# Patient Record
Sex: FEMALE | Race: WHITE | NOT HISPANIC OR LATINO | Employment: OTHER | URBAN - METROPOLITAN AREA
[De-identification: names, ages, dates, MRNs, and addresses within clinical notes are randomized per-mention and may not be internally consistent; named-entity substitution may affect disease eponyms.]

---

## 2017-02-28 ENCOUNTER — TELEPHONE (OUTPATIENT)
Dept: SLEEP MEDICINE | Facility: MEDICAL CENTER | Age: 77
End: 2017-02-28

## 2017-02-28 NOTE — TELEPHONE ENCOUNTER
"Patient has an upcoming appt with Basilia Ramsay,APRN 3/6/17 at the pulmonary clinic with a PFT 60 min, per not Ct was order results are not in the system would like to know if patient had CT. I called number on file there was no answer I was leaving a message to patient to call back regarding OV. Someone by the name Florentin answered stated he was the patient's  and she wasn't home. I advised I will check for his name in her chart to give any inform per HIPPA. He didn't give me a chance to look and Florentin responded with \" fine then she wont get the message\" and hung up on me. I wasn't able to leave a message on her cell phone as it is currently full.   "

## 2017-03-06 ENCOUNTER — APPOINTMENT (OUTPATIENT)
Dept: PULMONOLOGY | Facility: HOSPICE | Age: 77
End: 2017-03-06
Payer: MEDICARE

## 2017-03-30 ENCOUNTER — OFFICE VISIT (OUTPATIENT)
Dept: PULMONOLOGY | Facility: HOSPICE | Age: 77
End: 2017-03-30
Payer: MEDICARE

## 2017-03-30 ENCOUNTER — NON-PROVIDER VISIT (OUTPATIENT)
Dept: PULMONOLOGY | Facility: HOSPICE | Age: 77
End: 2017-03-30
Payer: MEDICARE

## 2017-03-30 VITALS
DIASTOLIC BLOOD PRESSURE: 80 MMHG | SYSTOLIC BLOOD PRESSURE: 128 MMHG | RESPIRATION RATE: 16 BRPM | OXYGEN SATURATION: 93 % | WEIGHT: 188 LBS | BODY MASS INDEX: 30.22 KG/M2 | HEART RATE: 90 BPM | TEMPERATURE: 97.9 F | HEIGHT: 66 IN

## 2017-03-30 DIAGNOSIS — J44.9 CHRONIC OBSTRUCTIVE PULMONARY DISEASE, UNSPECIFIED COPD TYPE (HCC): ICD-10-CM

## 2017-03-30 DIAGNOSIS — R09.82 POSTNASAL DRIP: ICD-10-CM

## 2017-03-30 DIAGNOSIS — R91.8 PULMONARY NODULES: ICD-10-CM

## 2017-03-30 DIAGNOSIS — J43.9 PULMONARY EMPHYSEMA, UNSPECIFIED EMPHYSEMA TYPE (HCC): ICD-10-CM

## 2017-03-30 PROCEDURE — 3017F COLORECTAL CA SCREEN DOC REV: CPT | Mod: 8P | Performed by: NURSE PRACTITIONER

## 2017-03-30 PROCEDURE — 1101F PT FALLS ASSESS-DOCD LE1/YR: CPT | Mod: 8P | Performed by: NURSE PRACTITIONER

## 2017-03-30 PROCEDURE — 4040F PNEUMOC VAC/ADMIN/RCVD: CPT | Performed by: NURSE PRACTITIONER

## 2017-03-30 PROCEDURE — G8417 CALC BMI ABV UP PARAM F/U: HCPCS | Performed by: NURSE PRACTITIONER

## 2017-03-30 PROCEDURE — 94060 EVALUATION OF WHEEZING: CPT | Performed by: INTERNAL MEDICINE

## 2017-03-30 PROCEDURE — G8482 FLU IMMUNIZE ORDER/ADMIN: HCPCS | Performed by: NURSE PRACTITIONER

## 2017-03-30 PROCEDURE — 94729 DIFFUSING CAPACITY: CPT | Performed by: INTERNAL MEDICINE

## 2017-03-30 PROCEDURE — G0008 ADMIN INFLUENZA VIRUS VAC: HCPCS | Performed by: NURSE PRACTITIONER

## 2017-03-30 PROCEDURE — 1036F TOBACCO NON-USER: CPT | Performed by: NURSE PRACTITIONER

## 2017-03-30 PROCEDURE — 94726 PLETHYSMOGRAPHY LUNG VOLUMES: CPT | Performed by: INTERNAL MEDICINE

## 2017-03-30 PROCEDURE — 99214 OFFICE O/P EST MOD 30 MIN: CPT | Mod: 25 | Performed by: NURSE PRACTITIONER

## 2017-03-30 PROCEDURE — G8432 DEP SCR NOT DOC, RNG: HCPCS | Performed by: NURSE PRACTITIONER

## 2017-03-30 PROCEDURE — 90662 IIV NO PRSV INCREASED AG IM: CPT | Performed by: NURSE PRACTITIONER

## 2017-03-30 RX ORDER — FLUTICASONE PROPIONATE 50 MCG
2 SPRAY, SUSPENSION (ML) NASAL DAILY
Qty: 3 BOTTLE | Refills: 3 | Status: SHIPPED | OUTPATIENT
Start: 2017-03-30 | End: 2018-02-02 | Stop reason: SDUPTHER

## 2017-03-30 RX ORDER — METHYLPREDNISOLONE 4 MG/1
TABLET ORAL
Qty: 21 TAB | Refills: 0 | Status: SHIPPED | OUTPATIENT
Start: 2017-03-30 | End: 2017-06-22

## 2017-03-30 RX ORDER — LEVALBUTEROL TARTRATE 45 UG/1
1-2 AEROSOL, METERED ORAL EVERY 4 HOURS PRN
Qty: 3 INHALER | Refills: 1 | Status: SHIPPED | OUTPATIENT
Start: 2017-03-30

## 2017-03-30 RX ORDER — BUDESONIDE AND FORMOTEROL FUMARATE DIHYDRATE 160; 4.5 UG/1; UG/1
2 AEROSOL RESPIRATORY (INHALATION) 2 TIMES DAILY
Qty: 3 INHALER | Refills: 3 | Status: SHIPPED | OUTPATIENT
Start: 2017-03-30 | End: 2018-02-02 | Stop reason: SDUPTHER

## 2017-03-30 ASSESSMENT — PULMONARY FUNCTION TESTS
FEV1_PERCENT_PREDICTED: 78
FEV1: 1.78
FEV1/FVC: 74.79
FEV1/FVC: 73
FEV1: 1.8
FVC_PREDICTED: 3.06
FEV1/FVC_PERCENT_PREDICTED: 100
FEV1/FVC_PERCENT_PREDICTED: 75
FVC_PERCENT_PREDICTED: 77
FEV1_PREDICTED: 2.3
FEV1_PERCENT_CHANGE: -3
FEV1_PERCENT_CHANGE: -1
FEV1/FVC_PERCENT_PREDICTED: 98
FEV1_PERCENT_PREDICTED: 77
FVC_PERCENT_PREDICTED: 80
FVC: 2.38
FVC: 2.47
FEV1/FVC_PERCENT_CHANGE: 33

## 2017-03-30 NOTE — PROCEDURES
Good patient effort & cooperation.  The results of this test meet the ATS/ERS standards for acceptability and repeatability.  Test was performed on the [employer] Body Plethysmograph- Elite DX system.  Predicted equations for Spirometry are NHanes, DLCO- [facility] volumes- [facility] [place].  The DLCO was uncorrected for Hgb.  A bronchodilatror of Xopenex HFA- 2puffs via spacer were administered    SPIROMETRY:  1. FVC was 2.47 L, 8 % of predicted  2. FEV1 was 1.81 L, 78 % of predicted   3. FEV1/FVC ratio was 75 %  4. There was no significant response to bronchodilators   5. Flow volume loop     LUNG VOLUMES:  1. TLC was 89 % of predicted   2. RV was  106 % of predicted     DIFFUSION CAPACITY:  1.Defusion capacity was  76 % of predicted       IMPRESSION:  The patient pulmonary function test does not meet the patient edition criteria for restrictive ventilatory defect however she has mild decrease in FVC and FEV1 with normal ratio. She also has mild decrease in diffusion capacity. Her total lung capacity is low normal at 89%. She has Also mildly decreased diffusion capacity to 76%. Avium these findings don't meet the definition criteria for restrictive ventilatory defect they are suggestive of early restriction for very mild restriction. Clinical correlation is required.

## 2017-03-30 NOTE — PATIENT INSTRUCTIONS
1) Continue Symbicort 160/4.5 and rescue inhaler  2) Continue Fluticasone nasal spray. Consider adding allergy pill for post nasal drip  4) Vaccines: Up to date with flu today, Prevnar 13. Pneumovax 23 today  5) CAT Scan due Feb 2017  6) Encouraged daily exercise within limit  7) Medrol pack for exacerbation   8) Return in about 6 months (around 9/30/2017) for review of symptoms, if not sooner, follow up with GOLD[facility] Heller, CAT scan results.
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
## 2017-03-30 NOTE — MR AVS SNAPSHOT
Lelo Bryant   3/30/2017 1:00 PM   Non-Provider Visit   MRN: [id number]    Department:  [facility]   Dept Phone:  [phone]    Description:  Female : 1940   Provider:  Annabelle Ledesma M.D.           Reason for Visit     COPD           Allergies as of 3/30/2017     Allergen Noted Reactions    Demerol 04/15/2016   Nausea      You were diagnosed with     Chronic obstructive pulmonary disease, unspecified COPD type (CMS-HCC)   [1155587]       Postnasal drip   [784.91.ICD-9-CM]       Pulmonary nodules   [302004]         Vital Signs     Smoking Status                   Former Smoker           Basic Information     Date Of Birth Sex Race Ethnicity Preferred Language    1940 Female White Non- English      Your appointments     Mar 30, 2017  2:40 PM   Established Patient Pul with DANIEL Rose   [facility] Pulmonary Medicine (--)    [address]  [address]  [facility] [id number] [phone]              Problem List              ICD-10-CM Priority Class Noted - Resolved    COLD (chronic obstructive lung disease) (CMS-HCC) J44.9   4/15/2016 - Present    Pulmonary nodules R91.8   2016 - Present    Pulmonary emphysema (CMS-HCC) J43.9   2016 - Present      Health Maintenance        Date Due Completion Dates    IMM DTaP/Tdap/Td Vaccine (1 - Tdap) 1961 ---    PAP SMEAR 1963 ---    MAMMOGRAM 1982 ---    COLONOSCOPY 1992 ---    IMM ZOSTER VACCINE 2002 ---    BONE DENSITY 2007 ---    IMM INFLUENZA (1) 2016 10/12/2015            Current Immunizations     13-VALENT PCV PREVNAR 10/12/2015    Influenza TIV (IM) 10/12/2015    Pneumococcal polysaccharide vaccine (PPSV-23) 2016      Below and/or attached are the medications your provider expects you to take. Review all of your home medications and newly ordered medications with your provider and/or pharmacist. Follow medication instructions as directed by your provider and/or  pharmacist. Please keep your medication list with you and share with your provider. Update the information when medications are discontinued, doses are changed, or new medications (including over-the-counter products) are added; and carry medication information at all times in the event of emergency situations     Allergies:  DEMEROL - Nausea               Medications  Valid as of: March 30, 2017 -  2:11 PM    Generic Name Brand Name Tablet Size Instructions for use    Atenolol (Tab) TENORMIN 25 MG Take 25 mg by mouth every day.        Azelastine-Fluticasone (Suspension) Azelastine-Fluticasone 137-50 MCG/ACT Spray 1 Spray in nose 2 Times a Day. 1 spray to each nostril twice a day        Budesonide-Formoterol Fumarate (Aerosol) SYMBICORT 160-4.5 MCG/ACT Inhale 2 Puffs by mouth 2 Times a Day.        Butalbital-APAP-Caffeine (Tab) FIORICET -40 MG Take 1 Tab by mouth 1 time daily as needed.        Cyclobenzaprine HCl (Tab) FLEXERIL 5 MG Take 5-10 mg by mouth 3 times a day as needed.        DiazePAM (Tab) VALIUM 5 MG Take 5 mg by mouth every 6 hours as needed for Anxiety.        Diclofenac Epolamine (Patch) FLECTOR 1.3 % Apply 1 Patch to skin as directed 2 Times a Day.        Estradiol (Tab) ESTRACE 2 MG Take 2 mg by mouth every day.        Fluticasone Propionate (Suspension) FLONASE 50 MCG/ACT Spray 2 Sprays in nose every day. 1-2 sprays each nostril daily.        Gemfibrozil (Tab) LOPID 600 MG Take 600 mg by mouth 2 times a day.        GlipiZIDE (TABLET SR 24 HR) GLUCOTROL 5 MG Take 5 mg by mouth every day.        GuaiFENesin (Tab) Guaifenesin 400 MG Take  by mouth.        Hydrocodone-Acetaminophen (Tab) NORCO  MG Take 1-2 Tabs by mouth every 6 hours as needed.        Ibuprofen (Tab) MOTRIN 800 MG Take 1 Tab by mouth 1 time daily as needed.        Levalbuterol Tartrate (Aerosol) XOPENEX HFA 45 MCG/ACT Inhale 1-2 Puffs by mouth every four hours as needed for Shortness of Breath.        MetFORMIN HCl (Tab)  GLUCOPHAGE 500 MG Take 500 mg by mouth 2 times a day, with meals.        MethylPREDNISolone (Kit) MEDROL DOSEPACK 4 MG See instruction packet        Omeprazole (CAPSULE DELAYED RELEASE) PRILOSEC 20 MG Take 20 mg by mouth every day.        Pregabalin (Cap) LYRICA 100 MG Take 100 mg by mouth 2 times a day.        .                 Medicines prescribed today were sent to:     [facility] - [place] [address]    Phone: [phone] Fax: [phone]    Open 24 Hours?: No    [facility] [place] [place] [facility] [address] [facility] [place] [address]    Phone: [phone] Fax: [phone]    Open 24 Hours?: No    [facility] - [place] [address]    Phone: [phone] Fax: [phone]    Open 24 Hours?: No      Medication refill instructions:       If your prescription bottle indicates you have medication refills left, it is not necessary to call your provider’s office. Please contact your pharmacy and they will refill your medication.    If your prescription bottle indicates you do not have any refills left, you may request refills at any time through one of the following ways: The online ethority system (except Urgent Care), by calling your provider’s office, or by asking your pharmacy to contact your provider’s office with a refill request. Medication refills are processed only during regular business hours and may not be available until the next business day. Your provider may request additional information or to have a follow-up visit with you prior to refilling your medication.   *Please Note: Medication refills are assigned a new Rx number when refilled electronically. Your pharmacy may indicate that no refills were authorized even though a new prescription for the same medication is available at the pharmacy. Please request the medicine by name  with the pharmacy before contacting your provider for a refill.           MyChart Access Code: Activation code not generated  Current MyChart Status: Active

## 2017-03-30 NOTE — MR AVS SNAPSHOT
"Lelo Bryant   3/30/2017 2:40 PM   Office Visit   MRN: [id number]    Department:  [facility]   Dept Phone:  [phone]    Description:  Female : 1940   Provider:  DANIEL Rose           Reason for Visit     Follow-Up Last seen 16    Results pft 60 min       Allergies as of 3/30/2017     Allergen Noted Reactions    Demerol 04/15/2016   Nausea      You were diagnosed with     Chronic obstructive pulmonary disease, unspecified COPD type (CMS-HCC)   [1981677]       Postnasal drip   [784.91.ICD-9-CM]       Pulmonary emphysema, unspecified emphysema type (CMS-HCC)   [8257005]       Pulmonary nodules   [694039]         Vital Signs     Blood Pressure Pulse Temperature Respirations Height Weight    128/80 mmHg 90 36.6 °C (97.9 °F) 16 1.676 m (5' 5.98\") 85.276 kg (188 lb)    Body Mass Index Oxygen Saturation Smoking Status             30.36 kg/m2 93% Former Smoker         Basic Information     Date Of Birth Sex Race Ethnicity Preferred Language    1940 Female White Non- English      Your appointments     Oct 02, 2017  1:00 PM   Established Patient Pul with DANIEL Rose   [facility] Pulmonary Medicine (--)    [facility] [address] [place] [id number] [phone]              Problem List              ICD-10-CM Priority Class Noted - Resolved    COLD (chronic obstructive lung disease) (CMS-HCC) J44.9   4/15/2016 - Present    Pulmonary nodules R91.8   2016 - Present    Pulmonary emphysema (CMS-HCC) J43.9   2016 - Present    Postnasal drip R09.82   3/30/2017 - Present      Health Maintenance        Date Due Completion Dates    IMM DTaP/Tdap/Td Vaccine (1 - Tdap) 1961 ---    PAP SMEAR 1963 ---    MAMMOGRAM 1982 ---    COLONOSCOPY 1992 ---    IMM ZOSTER VACCINE 2002 ---    BONE DENSITY 2007 ---    IMM INFLUENZA (1) 2016 10/12/2015            Current Immunizations     13-VALENT PCV PREVNAR 10/12/2015   " Influenza TIV (IM) 10/12/2015    Influenza Vaccine Adult HD  Incomplete    Pneumococcal polysaccharide vaccine (PPSV-23) 11/30/2016      Below and/or attached are the medications your provider expects you to take. Review all of your home medications and newly ordered medications with your provider and/or pharmacist. Follow medication instructions as directed by your provider and/or pharmacist. Please keep your medication list with you and share with your provider. Update the information when medications are discontinued, doses are changed, or new medications (including over-the-counter products) are added; and carry medication information at all times in the event of emergency situations     Allergies:  DEMEROL - Nausea               Medications  Valid as of: March 30, 2017 -  3:13 PM    Generic Name Brand Name Tablet Size Instructions for use    Atenolol (Tab) TENORMIN 25 MG Take 25 mg by mouth every day.        Budesonide-Formoterol Fumarate (Aerosol) SYMBICORT 160-4.5 MCG/ACT Inhale 2 Puffs by mouth 2 Times a Day.        Butalbital-APAP-Caffeine (Tab) FIORICET -40 MG Take 1 Tab by mouth 1 time daily as needed.        Cyclobenzaprine HCl (Tab) FLEXERIL 5 MG Take 5-10 mg by mouth 3 times a day as needed.        DiazePAM (Tab) VALIUM 5 MG Take 5 mg by mouth every 6 hours as needed for Anxiety.        Diclofenac Epolamine (Patch) FLECTOR 1.3 % Apply 1 Patch to skin as directed 2 Times a Day.        Estradiol (Tab) ESTRACE 2 MG Take 2 mg by mouth every day.        Fluticasone Propionate (Suspension) FLONASE 50 MCG/ACT Spray 2 Sprays in nose every day. 1-2 sprays each nostril daily.        Gemfibrozil (Tab) LOPID 600 MG Take 600 mg by mouth 2 times a day.        GlipiZIDE (TABLET SR 24 HR) GLUCOTROL 5 MG Take 5 mg by mouth every day.        GuaiFENesin (Tab) Guaifenesin 400 MG Take  by mouth.        Hydrocodone-Acetaminophen (Tab) NORCO  MG Take 1-2 Tabs by mouth every 6 hours as needed.        Ibuprofen  (Tab) MOTRIN 800 MG Take 1 Tab by mouth 1 time daily as needed.        Levalbuterol Tartrate (Aerosol) XOPENEX HFA 45 MCG/ACT Inhale 1-2 Puffs by mouth every four hours as needed for Shortness of Breath.        MetFORMIN HCl (Tab) GLUCOPHAGE 500 MG Take 500 mg by mouth 2 times a day, with meals.        MethylPREDNISolone (Tablet Therapy Pack) MEDROL DOSEPAK 4 MG Instructions per package        Omeprazole (CAPSULE DELAYED RELEASE) PRILOSEC 20 MG Take 20 mg by mouth every day.        Pregabalin (Cap) LYRICA 100 MG Take 100 mg by mouth 2 times a day.        Pregabalin (Cap) LYRICA 200 MG         .                 Medicines prescribed today were sent to:     [facility] - [address] [address]    Phone: [phone] Fax: [phone]    Open 24 Hours?: No    [facility] # [facility] [address] [address]    Phone: [phone] Fax: [phone]    Open 24 Hours?: No      Medication refill instructions:       If your prescription bottle indicates you have medication refills left, it is not necessary to call your provider’s office. Please contact your pharmacy and they will refill your medication.    If your prescription bottle indicates you do not have any refills left, you may request refills at any time through one of the following ways: The online Booklr system (except Urgent Care), by calling your provider’s office, or by asking your pharmacy to contact your provider’s office with a refill request. Medication refills are processed only during regular business hours and may not be available until the next business day. Your provider may request additional information or to have a follow-up visit with you prior to refilling your medication.   *Please Note: Medication refills are assigned a new Rx number when refilled electronically. Your pharmacy may indicate that no refills were authorized even though a  new prescription for the same medication is available at the pharmacy. Please request the medicine by name with the pharmacy before contacting your provider for a refill.        Instructions    1) Continue Symbicort 160/4.5 and rescue inhaler  2) Continue Fluticasone nasal spray. Consider adding allergy pill for post nasal drip  4) Vaccines: Up to date with flu today, Prevnar 13. Pneumovax 23 today  5) CAT Scan due Feb 2017  6) Encouraged daily exercise within limit  7) Medrol pack for exacerbation   8) Return in about 6 months (around 9/30/2017) for review of symptoms, if not sooner, follow up with [facility] Heller, CAT scan results.                Sw[employer]ield Access Code: Activation code not generated  Current SwypeShield Status: Active

## 2017-04-05 ENCOUNTER — TELEPHONE (OUTPATIENT)
Dept: PULMONOLOGY | Facility: HOSPICE | Age: 77
End: 2017-04-05

## 2017-04-05 NOTE — TELEPHONE ENCOUNTER
Patient calling she thought she was going to be getting a PET/CT, BUT what was order is a CT scan. I verified with Basilia SU that a regular CAT SCAN and follow up in 6 months per last office note of 3/3017. PATIENT IN AGREEMENT.

## 2017-04-07 ENCOUNTER — TELEPHONE (OUTPATIENT)
Dept: PULMONOLOGY | Facility: HOSPICE | Age: 77
End: 2017-04-07

## 2017-04-17 ENCOUNTER — TELEPHONE (OUTPATIENT)
Dept: PULMONOLOGY | Facility: HOSPICE | Age: 77
End: 2017-04-17

## 2017-04-17 ENCOUNTER — HOSPITAL ENCOUNTER (OUTPATIENT)
Dept: RADIOLOGY | Facility: MEDICAL CENTER | Age: 77
End: 2017-04-17
Attending: NURSE PRACTITIONER
Payer: MEDICARE

## 2017-04-17 DIAGNOSIS — R91.8 PULMONARY NODULES: ICD-10-CM

## 2017-04-17 DIAGNOSIS — R09.82 POSTNASAL DRIP: ICD-10-CM

## 2017-04-17 DIAGNOSIS — J44.9 CHRONIC OBSTRUCTIVE PULMONARY DISEASE, UNSPECIFIED COPD TYPE (HCC): ICD-10-CM

## 2017-04-17 PROCEDURE — 71250 CT THORAX DX C-: CPT

## 2017-04-17 NOTE — TELEPHONE ENCOUNTER
Attempted to call patient with results of CAT scan. Voicemail was full. Per CAT scan no new changes were noted. We'll follow-up with a additional CAT scan in 6 months prior to her next office visit.

## 2017-05-11 ENCOUNTER — HOSPITAL ENCOUNTER (OUTPATIENT)
Dept: RADIOLOGY | Facility: MEDICAL CENTER | Age: 77
End: 2017-05-11
Attending: OTOLARYNGOLOGY
Payer: MEDICARE

## 2017-05-11 DIAGNOSIS — J38.01 UNILATERAL PARTIAL PARALYSIS OF VOCAL CORDS OR LARYNX: ICD-10-CM

## 2017-05-11 DIAGNOSIS — R49.8 NEUROLOGIC VOICE DISORDER: ICD-10-CM

## 2017-05-11 PROCEDURE — 70491 CT SOFT TISSUE NECK W/DYE: CPT

## 2017-05-11 PROCEDURE — 700117 HCHG RX CONTRAST REV CODE 255: Performed by: OTOLARYNGOLOGY

## 2017-05-11 RX ADMIN — IOHEXOL 75 ML: 350 INJECTION, SOLUTION INTRAVENOUS at 15:24

## 2017-06-22 ENCOUNTER — APPOINTMENT (OUTPATIENT)
Dept: ADMISSIONS | Facility: MEDICAL CENTER | Age: 77
End: 2017-06-22
Attending: OTOLARYNGOLOGY
Payer: MEDICARE

## 2017-06-30 ENCOUNTER — HOSPITAL ENCOUNTER (OUTPATIENT)
Facility: MEDICAL CENTER | Age: 77
End: 2017-06-30
Attending: OTOLARYNGOLOGY | Admitting: OTOLARYNGOLOGY
Payer: MEDICARE

## 2017-06-30 VITALS
HEIGHT: 66 IN | OXYGEN SATURATION: 94 % | RESPIRATION RATE: 16 BRPM | WEIGHT: 185.85 LBS | TEMPERATURE: 97.1 F | DIASTOLIC BLOOD PRESSURE: 70 MMHG | BODY MASS INDEX: 29.87 KG/M2 | HEART RATE: 81 BPM | SYSTOLIC BLOOD PRESSURE: 138 MMHG

## 2017-06-30 PROBLEM — J38.00 PARALYZED VOCAL CORDS: Status: ACTIVE | Noted: 2017-06-30

## 2017-06-30 LAB
ANION GAP SERPL CALC-SCNC: 12 MMOL/L (ref 0–11.9)
BUN SERPL-MCNC: 14 MG/DL (ref 8–22)
CALCIUM SERPL-MCNC: 9 MG/DL (ref 8.5–10.5)
CHLORIDE SERPL-SCNC: 104 MMOL/L (ref 96–112)
CO2 SERPL-SCNC: 20 MMOL/L (ref 20–33)
CREAT SERPL-MCNC: 0.65 MG/DL (ref 0.5–1.4)
EKG IMPRESSION: NORMAL
ERYTHROCYTE [DISTWIDTH] IN BLOOD BY AUTOMATED COUNT: 49.7 FL (ref 35.9–50)
GFR SERPL CREATININE-BSD FRML MDRD: >60 ML/MIN/1.73 M 2
GLUCOSE BLD-MCNC: 132 MG/DL (ref 65–99)
GLUCOSE SERPL-MCNC: 145 MG/DL (ref 65–99)
HCT VFR BLD AUTO: 41.6 % (ref 37–47)
HGB BLD-MCNC: 13.7 G/DL (ref 12–16)
MCH RBC QN AUTO: 29.1 PG (ref 27–33)
MCHC RBC AUTO-ENTMCNC: 32.9 G/DL (ref 33.6–35)
MCV RBC AUTO: 88.3 FL (ref 81.4–97.8)
PLATELET # BLD AUTO: 160 K/UL (ref 164–446)
PMV BLD AUTO: 11.1 FL (ref 9–12.9)
POTASSIUM SERPL-SCNC: 4.7 MMOL/L (ref 3.6–5.5)
RBC # BLD AUTO: 4.71 M/UL (ref 4.2–5.4)
SODIUM SERPL-SCNC: 136 MMOL/L (ref 135–145)
WBC # BLD AUTO: 8.6 K/UL (ref 4.8–10.8)

## 2017-06-30 PROCEDURE — 500126 HCHG BOVIE, NEEDLE TIP: Performed by: OTOLARYNGOLOGY

## 2017-06-30 PROCEDURE — 85027 COMPLETE CBC AUTOMATED: CPT

## 2017-06-30 PROCEDURE — 160048 HCHG OR STATISTICAL LEVEL 1-5: Performed by: OTOLARYNGOLOGY

## 2017-06-30 PROCEDURE — 502573 HCHG PACK, ENT: Performed by: OTOLARYNGOLOGY

## 2017-06-30 PROCEDURE — 93010 ELECTROCARDIOGRAM REPORT: CPT | Performed by: INTERNAL MEDICINE

## 2017-06-30 PROCEDURE — 160036 HCHG PACU - EA ADDL 30 MINS PHASE I: Performed by: OTOLARYNGOLOGY

## 2017-06-30 PROCEDURE — 700111 HCHG RX REV CODE 636 W/ 250 OVERRIDE (IP)

## 2017-06-30 PROCEDURE — A9270 NON-COVERED ITEM OR SERVICE: HCPCS

## 2017-06-30 PROCEDURE — 700101 HCHG RX REV CODE 250

## 2017-06-30 PROCEDURE — 502240 HCHG MISC OR SUPPLY RC 0272: Performed by: OTOLARYNGOLOGY

## 2017-06-30 PROCEDURE — 160002 HCHG RECOVERY MINUTES (STAT): Performed by: OTOLARYNGOLOGY

## 2017-06-30 PROCEDURE — 500088 HCHG BLADE, SAGITTAL: Performed by: OTOLARYNGOLOGY

## 2017-06-30 PROCEDURE — 501838 HCHG SUTURE GENERAL: Performed by: OTOLARYNGOLOGY

## 2017-06-30 PROCEDURE — 93005 ELECTROCARDIOGRAM TRACING: CPT | Performed by: OTOLARYNGOLOGY

## 2017-06-30 PROCEDURE — 160009 HCHG ANES TIME/MIN: Performed by: OTOLARYNGOLOGY

## 2017-06-30 PROCEDURE — 82962 GLUCOSE BLOOD TEST: CPT

## 2017-06-30 PROCEDURE — 160028 HCHG SURGERY MINUTES - 1ST 30 MINS LEVEL 3: Performed by: OTOLARYNGOLOGY

## 2017-06-30 PROCEDURE — 80048 BASIC METABOLIC PNL TOTAL CA: CPT

## 2017-06-30 PROCEDURE — 160039 HCHG SURGERY MINUTES - EA ADDL 1 MIN LEVEL 3: Performed by: OTOLARYNGOLOGY

## 2017-06-30 PROCEDURE — 160035 HCHG PACU - 1ST 60 MINS PHASE I: Performed by: OTOLARYNGOLOGY

## 2017-06-30 PROCEDURE — 700102 HCHG RX REV CODE 250 W/ 637 OVERRIDE(OP)

## 2017-06-30 PROCEDURE — 502000 HCHG MISC OR IMPLANTS RC 0278: Performed by: OTOLARYNGOLOGY

## 2017-06-30 PROCEDURE — 500123 HCHG BOVIE, CONTROL W/BLADE: Performed by: OTOLARYNGOLOGY

## 2017-06-30 RX ORDER — LIDOCAINE AND PRILOCAINE 25; 25 MG/G; MG/G
1 CREAM TOPICAL
Status: DISCONTINUED | OUTPATIENT
Start: 2017-06-30 | End: 2017-06-30 | Stop reason: HOSPADM

## 2017-06-30 RX ORDER — HYDROCODONE BITARTRATE AND ACETAMINOPHEN 5; 325 MG/1; MG/1
1-2 TABLET ORAL EVERY 4 HOURS PRN
Qty: 40 TAB | Refills: 0 | Status: SHIPPED | OUTPATIENT
Start: 2017-06-30 | End: 2017-10-09

## 2017-06-30 RX ORDER — LIDOCAINE HYDROCHLORIDE 10 MG/ML
0.5 INJECTION, SOLUTION INFILTRATION; PERINEURAL
Status: DISCONTINUED | OUTPATIENT
Start: 2017-06-30 | End: 2017-06-30 | Stop reason: HOSPADM

## 2017-06-30 RX ORDER — ONDANSETRON 2 MG/ML
4 INJECTION INTRAMUSCULAR; INTRAVENOUS
Status: DISCONTINUED | OUTPATIENT
Start: 2017-06-30 | End: 2017-06-30 | Stop reason: HOSPADM

## 2017-06-30 RX ORDER — LIDOCAINE HYDROCHLORIDE AND EPINEPHRINE BITARTRATE 20; .01 MG/ML; MG/ML
INJECTION, SOLUTION SUBCUTANEOUS
Status: DISCONTINUED | OUTPATIENT
Start: 2017-06-30 | End: 2017-06-30 | Stop reason: HOSPADM

## 2017-06-30 RX ORDER — LIDOCAINE HYDROCHLORIDE AND EPINEPHRINE BITARTRATE 20; .01 MG/ML; MG/ML
INJECTION, SOLUTION SUBCUTANEOUS
Status: DISCONTINUED
Start: 2017-06-30 | End: 2017-06-30 | Stop reason: HOSPADM

## 2017-06-30 RX ORDER — SODIUM CHLORIDE, SODIUM LACTATE, POTASSIUM CHLORIDE, CALCIUM CHLORIDE 600; 310; 30; 20 MG/100ML; MG/100ML; MG/100ML; MG/100ML
INJECTION, SOLUTION INTRAVENOUS CONTINUOUS
Status: DISCONTINUED | OUTPATIENT
Start: 2017-06-30 | End: 2017-06-30 | Stop reason: HOSPADM

## 2017-06-30 RX ORDER — LIDOCAINE HCL/EPINEPHRINE/PF 2%-1:200K
VIAL (ML) INJECTION
Status: DISCONTINUED
Start: 2017-06-30 | End: 2017-06-30 | Stop reason: HOSPADM

## 2017-06-30 RX ORDER — OXYMETAZOLINE HYDROCHLORIDE 0.05 G/100ML
SPRAY NASAL
Status: COMPLETED
Start: 2017-06-30 | End: 2017-06-30

## 2017-06-30 RX ORDER — ONDANSETRON 4 MG/1
4 TABLET, ORALLY DISINTEGRATING ORAL EVERY 8 HOURS PRN
Qty: 20 TAB | Refills: 0 | Status: SHIPPED | OUTPATIENT
Start: 2017-06-30 | End: 2019-01-09

## 2017-06-30 RX ADMIN — HYDROCODONE BITARTRATE AND ACETAMINOPHEN 15 ML: 2.5; 108 SOLUTION ORAL at 11:23

## 2017-06-30 RX ADMIN — OXYMETAZOLINE HYDROCHLORIDE 2 SPRAY: 5 SPRAY NASAL at 09:15

## 2017-06-30 RX ADMIN — FENTANYL CITRATE 25 MCG: 50 INJECTION, SOLUTION INTRAMUSCULAR; INTRAVENOUS at 11:45

## 2017-06-30 RX ADMIN — SODIUM CHLORIDE, SODIUM LACTATE, POTASSIUM CHLORIDE, CALCIUM CHLORIDE: 600; 310; 30; 20 INJECTION, SOLUTION INTRAVENOUS at 09:10

## 2017-06-30 ASSESSMENT — PAIN SCALES - GENERAL
PAINLEVEL_OUTOF10: 3
PAINLEVEL_OUTOF10: 3
PAINLEVEL_OUTOF10: 0
PAINLEVEL_OUTOF10: 3
PAINLEVEL_OUTOF10: 0
PAINLEVEL_OUTOF10: 3
PAINLEVEL_OUTOF10: 5
PAINLEVEL_OUTOF10: 3

## 2017-06-30 NOTE — IP AVS SNAPSHOT
6/30/2017    [place] [address]    Dear Lelo:    [facility] wants to ensure your discharge home is safe and you or your loved ones have had all of your questions answered regarding your care after you leave the hospital.    Below is a list of resources and contact information should you have any questions regarding your hospital stay, follow-up instructions, or active medical symptoms.    Questions or Concerns Regarding… Contact   Medical Questions Related to Your Discharge  (7 days a week, 8am-5pm) Contact a Nurse Care Coordinator   [phone]   Medical Questions Not Related to Your Discharge  (24 hours a day / 7 days a week)  Contact the Nurse Health Line   [phone]    Medications or Discharge Instructions Refer to your discharge packet   or contact your [facility] Primary Care Provider   [phone]   Follow-up Appointment(s) Schedule your appointment via [employer]   or contact Scheduling [phone]   Billing Review your statement via [employer]  or contact Billing [phone]   Medical Records Review your records via [employer]   or contact Medical Records [phone]     You may receive a telephone call within two days of discharge. This call is to make certain you understand your discharge instructions and have the opportunity to have any questions answered. You can also easily access your medical information, test results and upcoming appointments via the [employer] free online health management tool. You can learn more and sign up at [employer]. For assistance setting up your [employer] account, please call [phone].    Once again, we want to ensure your discharge home is safe and that you have a clear understanding of any next steps in your care. If you have any questions or concerns, please do not hesitate to contact us, we are here for you. Thank you for choosing [facility] for your healthcare needs.    Sincerely,    Your [facility] Healthcare Team

## 2017-06-30 NOTE — OP REPORT
Dictation service down.  Hand written note.    Pre Op dx:l tvc paralysis, hoarseness  Post op:same    Procedure:left thyroplasty    Op Findings:l tvc paralysis.  Size 9 female implant placed.    Procedure:pt given sedation anesthesia.  Draped in the normal surgical fashion.  Sterilized with betadine.  Marked and injected with 10 cc lido w epi.  Incision thru skin w 15 blade.  Median raphe devided and strap muscles elevated of of laryngeal cartilage.  The window was marked appropriately with the measure kit.  The window was then made using an oscillating saw.  The sizers were then used to determine proper implant size.  Once the 9 was decided it was placed in the window without difficulty.  Voice was verified thruout the process and found to be substancially improved. A flexible laryngoscope was performed to verify adequate medialization of the left tvc.  It was found be be in the median position.  The strap muscles were sutured back in the midline with 3.0 vicryl.  The skin was closed in two layers.  Vicryl for deep and 5.0 mono for skin.  At this point pt was turned back to anesthesia for full emergence.    Complications:none    IV fluids:500cc    EBL:15cc

## 2017-06-30 NOTE — IP AVS SNAPSHOT
" Home Care Instructions                                                                                                                Name:Lelo Bryant  Medical Record Number:[id number]  CSN: [id number]    YOB: 1940   Age: 77 y.o.  Sex: female  HT:1.676 m (5' 6\") WT: 84.3 kg (185 lb 13.6 oz)          Admit Date: 6/30/2017     Discharge Date:   Today's Date: 6/30/2017  Attending Doctor:  Yosvany Gilmore M.D.                  Allergies:  Demerol                Discharge Instructions         ACTIVITY: Rest and take it easy for the first 24 hours.  A responsible adult is recommended to remain with you during that time.  It is normal to feel sleepy.  We encourage you to not do anything that requires balance, judgment or coordination.    MILD FLU-LIKE SYMPTOMS ARE NORMAL. YOU MAY EXPERIENCE GENERALIZED MUSCLE ACHES, THROAT IRRITATION, HEADACHE AND/OR SOME NAUSEA.    FOR 24 HOURS DO NOT:  Drive, operate machinery or run household appliances.  Drink beer or alcoholic beverages.   Make important decisions or sign legal documents.    SPECIAL INSTRUCTIONS: See attached instruction sheet.    Voice rest for 24 hours -Then \"arms distance voice\" x 1 wk. -Do not get incision wet for 48 hours. Then may get wet, but do not soak. -Clean incision twice daily with 50/50 water/hydrogen peroxide and keep covered with bacitracin ointment.       DIET: To avoid nausea, slowly advance diet as tolerated, avoiding spicy or greasy foods for the first day.  Add more substantial food to your diet according to your physician's instructions.  Babies can be fed formula or breast milk as soon as they are hungry.  INCREASE FLUIDS AND FIBER TO AVOID CONSTIPATION.    SURGICAL DRESSING/BATHING: *May shower tomorrow.  **    FOLLOW-UP APPOINTMENT:  A follow-up appointment should be arranged with your doctor, call to schedule.    You should CALL YOUR PHYSICIAN if you develop:  Fever greater than 101 degrees F.  Pain not relieved by medication, " or persistent nausea or vomiting.  Excessive bleeding (blood soaking through dressing) or unexpected drainage from the wound.  Extreme redness or swelling around the incision site, drainage of pus or foul smelling drainage.  Inability to urinate or empty your bladder within 8 hours.  Problems with breathing or chest pain.    You should call 911 if you develop problems with breathing or chest pain.  If you are unable to contact your doctor or surgical center, you should go to the nearest emergency room or urgent care center.  Physician's telephone #: *Dr Gilmore [phone]**    If any questions arise, call your doctor.  If your doctor is not available, please feel free to call the Surgical Center at [phone].  The Center is open Monday through Friday from 7AM to 7PM.  You can also call the [email or web address] HOTLINE open 24 hours/day, 7 days/week and speak to a nurse at [phone], or toll free at [phone].    A registered nurse may call you a few days after your surgery to see how you are doing after your procedure.    MEDICATIONS: Resume taking daily medication.  Take prescribed pain medication with food.  If no medication is prescribed, you may take non-aspirin pain medication if needed.  PAIN MEDICATION CAN BE VERY CONSTIPATING.  Take a stool softener or laxative such as senokot, pericolace, or milk of magnesia if needed.    Prescription given for *Norco and Zofran**.  Last pain medication given at *11:30, next dose due at 3:30**.    If your physician has prescribed pain medication that includes Acetaminophen (Tylenol), do not take additional Acetaminophen (Tylenol) while taking the prescribed medication.    Depression / Suicide Risk    As you are discharged from this [facility] facility, it is important to learn how to keep safe from harming yourself.    Recognize the warning signs:  · Abrupt changes in personality, positive or negative- including increase in energy   · Giving away possessions  · Change in  eating patterns- significant weight changes-  positive or negative  · Change in sleeping patterns- unable to sleep or sleeping all the time   · Unwillingness or inability to communicate  · Depression  · Unusual sadness, discouragement and loneliness  · Talk of wanting to die  · Neglect of personal appearance   · Rebelliousness- reckless behavior  · Withdrawal from people/activities they love  · Confusion- inability to concentrate     If you or a loved one observes any of these behaviors or has concerns about self-harm, here's what you can do:  · Talk about it- your feelings and reasons for harming yourself  · Remove any means that you might use to hurt yourself (examples: pills, rope, extension cords, firearm)  · Get professional help from the community (Mental Health, Substance Abuse, psychological counseling)  · Do not be alone:Call your Safe Contact- someone whom you trust who will be there for you.  · Call your local CRISIS HOTLINE 157-9928 or 781-871-4935  · Call your local Children's Mobile Crisis Response Team [place] (025) 604-6862 or wwwEverypost  · Call the toll free National Suicide Prevention Hotlines   · National Suicide Prevention Lifeline 843-855-DTOC (2152)  · National Hope Line Network 800-SUICIDE (443-0599)       Medication List      START taking these medications        Instructions    Morning Afternoon Evening Bedtime    ondansetron 4 MG Tbdp   Commonly known as:  ZOFRAN ODT        Take 1 Tab by mouth every 8 hours as needed for Nausea/Vomiting.   Dose:  4 mg                          CHANGE how you take these medications        Instructions    Morning Afternoon Evening Bedtime    * hydrocodone/acetaminophen  MG Tabs   What changed:  Another medication with the same name was added. Make sure you understand how and when to take each.   Commonly known as:  NORCO        Take 1-2 Tabs by mouth 2 Times a Day.   Dose:  1-2 Tab                        * hydrocodone-acetaminophen 5-325  MG Tabs per tablet   What changed:  You were already taking a medication with the same name, and this prescription was added. Make sure you understand how and when to take each.   Commonly known as:  NORCO        Take 1-2 Tabs by mouth every four hours as needed.   Dose:  1-2 Tab                        * Notice:  This list has 2 medication(s) that are the same as other medications prescribed for you. Read the directions carefully, and ask your doctor or other care provider to review them with you.      CONTINUE taking these medications        Instructions    Morning Afternoon Evening Bedtime    acetaminophen/caffeine/butalbital 325-40-50 mg -40 MG Tabs   Commonly known as:  FIORICET        Take 1 Tab by mouth 1 time daily as needed.   Dose:  1 Tab                        atenolol 25 MG Tabs   Commonly known as:  TENORMIN        Take 25 mg by mouth 2 times a day.   Dose:  25 mg                        budesonide-formoterol 160-4.5 MCG/ACT Aero   Commonly known as:  SYMBICORT        Doctor's comments:  Rinse mouth after each use   Inhale 2 Puffs by mouth 2 Times a Day.   Dose:  2 Puff                        cyclobenzaprine 5 MG tablet   Commonly known as:  FLEXERIL        Take 5 mg by mouth 3 times a day as needed.   Dose:  5 mg                        diazepam 5 MG Tabs   Commonly known as:  VALIUM        Take 5 mg by mouth every 6 hours as needed for Anxiety.   Dose:  5 mg                        diclofenac epolamine 1.3 % Ptch   Commonly known as:  FLECTOR        Apply 1 Patch to skin as directed as needed.   Dose:  1 Patch                        estradiol 2 MG Tabs   Commonly known as:  ESTRACE        Take 2 mg by mouth every day.   Dose:  2 mg                        fluticasone 50 MCG/ACT nasal spray   Commonly known as:  FLONASE        Spray 2 Sprays in nose every day. 1-2 sprays each nostril daily.   Dose:  2 Spray                        gemfibrozil 600 MG Tabs   Commonly known as:  LOPID        Take 600 mg  by mouth 2 times a day.   Dose:  600 mg                        glipiZIDE SR 5 MG Tb24   Commonly known as:  GLUCOTROL        Take 5 mg by mouth every day.   Dose:  5 mg                        levalbuterol 45 MCG/ACT inhaler   Commonly known as:  XOPENEX HFA        Inhale 1-2 Puffs by mouth every four hours as needed for Shortness of Breath.   Dose:  1-2 Puff                        LYRICA 200 MG capsule   Generic drug:  pregabalin        200 mg 3 times a day.   Dose:  200 mg                        metformin 500 MG Tabs   Commonly known as:  GLUCOPHAGE        Take 500 mg by mouth 2 times a day, with meals.   Dose:  500 mg                        omeprazole 20 MG delayed-release capsule   Commonly known as:  PRILOSEC        Take 20 mg by mouth every day.   Dose:  20 mg                        OXYGEN-HELIUM INH        Inhale 3 L by mouth every day.   Dose:  3 L                        VOLTAREN 1 % Gel   Generic drug:  Diclofenac Sodium        Apply  to skin as directed as needed.                             Where to Get Your Medications      Information about where to get these medications is not yet available     ! Ask your nurse or doctor about these medications    - hydrocodone-acetaminophen 5-325 MG Tabs per tablet  - ondansetron 4 MG Tbdp            Medication Information     Above and/or attached are the medications your physician expects you to take upon discharge. Review all of your home medications and newly ordered medications with your doctor and/or pharmacist. Follow medication instructions as directed by your doctor and/or pharmacist. Please keep your medication list with you and share with your physician. Update the information when medications are discontinued, doses are changed, or new medications (including over-the-counter products) are added; and carry medication information at all times in the event of emergency situations.        Resources     Quit Smoking / Tobacco Use:    I understand the use of any  tobacco products increases my chance of suffering from future heart disease or stroke and could cause other illnesses which may shorten my life. Quitting the use of tobacco products is the single most important thing I can do to improve my health. For further information on smoking / tobacco cessation call a Toll Free Quit Line at 1-689.868.8696 (*National Cancer Addy) or 1-758.180.5454 (American Lung Association) or you can access the web based program at www.lungusa.org.    [place] Tobacco Users Help Line:  (161) 627-5041       Toll Free: 1-466.605.3629  Quit Tobacco Program [facility] (594)122-7958    Crisis Hotline:    [place] Crisis Hotline:  9-573-BNRQGUE or 1-706.300.9776    [place] Crisis Hotline:    1-828.769.8241 or 009-275-6791    Discharge Survey:   Thank you for choosing [facility]. We hope we did everything we could to make your hospital stay a pleasant one. You may be receiving a survey and we would appreciate your time and participation in answering the questions. Your input is very valuable to us in our efforts to improve our service to our patients and their families.            Signatures     My signature on this form indicates that:    1. I acknowledge receipt and understanding of these Home Care Instruction.  2. My questions regarding this information have been answered to my satisfaction.  3. I have formulated a plan with my discharge nurse to obtain my prescribed medications for home.    __________________________________      __________________________________                   Patient Signature                                 Guardian/Responsible Adult Signature      __________________________________                 __________       ________                       Nurse Signature                                               Date                 Time

## 2017-06-30 NOTE — OR NURSING
1110 Received from OR, report from Dr. Mazariegos.    Neck incision well approximated CDI.      1123 Oral pain medication given see mar.      1145 Iv pain medication given see mar    1230 Pt sleeping, no s/s of distress noted.      1345 Dc instructions completed with PT and her .      1356 Dc home, pt has all belongings and home oxygen tank at  3 liters.

## 2017-06-30 NOTE — DISCHARGE INSTRUCTIONS
"  ACTIVITY: Rest and take it easy for the first 24 hours.  A responsible adult is recommended to remain with you during that time.  It is normal to feel sleepy.  We encourage you to not do anything that requires balance, judgment or coordination.    MILD FLU-LIKE SYMPTOMS ARE NORMAL. YOU MAY EXPERIENCE GENERALIZED MUSCLE ACHES, THROAT IRRITATION, HEADACHE AND/OR SOME NAUSEA.    FOR 24 HOURS DO NOT:  Drive, operate machinery or run household appliances.  Drink beer or alcoholic beverages.   Make important decisions or sign legal documents.    SPECIAL INSTRUCTIONS: See attached instruction sheet.    Voice rest for 24 hours -Then \"arms distance voice\" x 1 wk. -Do not get incision wet for 48 hours. Then may get wet, but do not soak. -Clean incision twice daily with 50/50 water/hydrogen peroxide and keep covered with bacitracin ointment.       DIET: To avoid nausea, slowly advance diet as tolerated, avoiding spicy or greasy foods for the first day.  Add more substantial food to your diet according to your physician's instructions.  Babies can be fed formula or breast milk as soon as they are hungry.  INCREASE FLUIDS AND FIBER TO AVOID CONSTIPATION.    SURGICAL DRESSING/BATHING: *May shower tomorrow.  **    FOLLOW-UP APPOINTMENT:  A follow-up appointment should be arranged with your doctor, call to schedule.    You should CALL YOUR PHYSICIAN if you develop:  Fever greater than 101 degrees F.  Pain not relieved by medication, or persistent nausea or vomiting.  Excessive bleeding (blood soaking through dressing) or unexpected drainage from the wound.  Extreme redness or swelling around the incision site, drainage of pus or foul smelling drainage.  Inability to urinate or empty your bladder within 8 hours.  Problems with breathing or chest pain.    You should call 911 if you develop problems with breathing or chest pain.  If you are unable to contact your doctor or surgical center, you should go to the nearest emergency room " or urgent care center.  Physician's telephone #: *Dr Gilmore [phone]**    If any questions arise, call your doctor.  If your doctor is not available, please feel free to call the Surgical Center at [phone].  The Center is open Monday through Friday from 7AM to 7PM.  You can also call the HEALTH HOTLINE open 24 hours/day, 7 days/week and speak to a nurse at [phone], or toll free at [phone].    A registered nurse may call you a few days after your surgery to see how you are doing after your procedure.    MEDICATIONS: Resume taking daily medication.  Take prescribed pain medication with food.  If no medication is prescribed, you may take non-aspirin pain medication if needed.  PAIN MEDICATION CAN BE VERY CONSTIPATING.  Take a stool softener or laxative such as senokot, pericolace, or milk of magnesia if needed.    Prescription given for *Norco and Zofran**.  Last pain medication given at *11:30, next dose due at 3:30**.    If your physician has prescribed pain medication that includes Acetaminophen (Tylenol), do not take additional Acetaminophen (Tylenol) while taking the prescribed medication.    Depression / Suicide Risk    As you are discharged from this [facility] facility, it is important to learn how to keep safe from harming yourself.    Recognize the warning signs:  · Abrupt changes in personality, positive or negative- including increase in energy   · Giving away possessions  · Change in eating patterns- significant weight changes-  positive or negative  · Change in sleeping patterns- unable to sleep or sleeping all the time   · Unwillingness or inability to communicate  · Depression  · Unusual sadness, discouragement and loneliness  · Talk of wanting to die  · Neglect of personal appearance   · Rebelliousness- reckless behavior  · Withdrawal from people/activities they love  · Confusion- inability to concentrate     If you or a loved one observes any of these behaviors or has concerns  about self-harm, here's what you can do:  · Talk about it- your feelings and reasons for harming yourself  · Remove any means that you might use to hurt yourself (examples: pills, rope, extension cords, firearm)  · Get professional help from the community (Mental Health, Substance Abuse, psychological counseling)  · Do not be alone:Call your Safe Contact- someone whom you trust who will be there for you.  · Call your local CRISIS HOTLINE 835-8898 or 191-839-1269  · Call your local Children's Mobile Crisis Response Team [place] (435) 520-5199 or www.[employer]  · Call the toll free National Suicide Prevention Hotlines   · National Suicide Prevention Lifeline 396-260-PWCH (1470)  · National Hope Line Network 800-SUICIDE (336-0311)

## 2017-09-27 ENCOUNTER — HOSPITAL ENCOUNTER (OUTPATIENT)
Facility: MEDICAL CENTER | Age: 77
End: 2017-09-27
Attending: OTOLARYNGOLOGY | Admitting: OTOLARYNGOLOGY
Payer: MEDICARE

## 2017-10-06 ENCOUNTER — APPOINTMENT (OUTPATIENT)
Dept: ADMISSIONS | Facility: MEDICAL CENTER | Age: 77
End: 2017-10-06
Payer: MEDICARE

## 2017-10-06 ENCOUNTER — OFFICE VISIT (OUTPATIENT)
Dept: PULMONOLOGY | Facility: HOSPICE | Age: 77
End: 2017-10-06
Payer: MEDICARE

## 2017-10-06 VITALS
BODY MASS INDEX: 30.53 KG/M2 | TEMPERATURE: 97.7 F | HEIGHT: 66 IN | OXYGEN SATURATION: 96 % | DIASTOLIC BLOOD PRESSURE: 72 MMHG | WEIGHT: 190 LBS | HEART RATE: 78 BPM | RESPIRATION RATE: 16 BRPM | SYSTOLIC BLOOD PRESSURE: 122 MMHG

## 2017-10-06 DIAGNOSIS — J43.9 PULMONARY EMPHYSEMA, UNSPECIFIED EMPHYSEMA TYPE (HCC): ICD-10-CM

## 2017-10-06 DIAGNOSIS — R91.8 PULMONARY NODULES: ICD-10-CM

## 2017-10-06 PROCEDURE — 99213 OFFICE O/P EST LOW 20 MIN: CPT | Performed by: NURSE PRACTITIONER

## 2017-10-06 PROCEDURE — 94761 N-INVAS EAR/PLS OXIMETRY MLT: CPT | Performed by: NURSE PRACTITIONER

## 2017-10-06 NOTE — PATIENT INSTRUCTIONS
1) Continue Symbicort 160/4.5 and rescue inhaler  2) Continue Fluticasone nasal spray. Consider adding allergy pill for post nasal drip  4) Vaccines: Up to date with flu, Prevnar 13,  Pneumovax 23   5) CAT Scan due Jan 2018  6) Encouraged daily exercise within limit  7) Continue 24/7 oxygen at 2L  8) Return in about 3 months (around 1/6/2018) for review of symptoms, if not sooner, follow up with [facility], CAT scan results.

## 2017-10-06 NOTE — PROCEDURES
Multi-Ox Readings  Multi Ox #1 Room air   O2 sat % at rest 95   O2 sat % on exertion 86   O2 sat average on exertion     Multi Ox #2 2 LPM   O2 sat % at rest 96   O2 sat % on exertion 90   O2 sat average on exertion       Oxygen Use 3   Oxygen Frequency 24/7   Duration of need     Is the patient mobile within the home?     CPAP Use?     BIPAP Use?     Servo Titration

## 2017-10-09 ENCOUNTER — APPOINTMENT (OUTPATIENT)
Dept: ADMISSIONS | Facility: MEDICAL CENTER | Age: 77
End: 2017-10-09
Attending: OTOLARYNGOLOGY
Payer: MEDICARE

## 2017-10-11 ENCOUNTER — PATIENT MESSAGE (OUTPATIENT)
Dept: PULMONOLOGY | Facility: HOSPICE | Age: 77
End: 2017-10-11

## 2017-10-12 ENCOUNTER — TELEPHONE (OUTPATIENT)
Dept: PULMONOLOGY | Facility: HOSPICE | Age: 77
End: 2017-10-12

## 2017-10-12 NOTE — TELEPHONE ENCOUNTER
----- Message from Lelo Bryant sent at 10/11/2017 10:27 PM PDT -----  Regarding: Prescription Question  Contact: [phone]  Andrea Cui.  Can you tell me if you faxed a request to [employer] about a smaller portable oxgyen unit for me??  It would make it so much easier for me to use it all the time.  Thank you.

## 2017-10-16 ENCOUNTER — TELEPHONE (OUTPATIENT)
Dept: SLEEP MEDICINE | Facility: MEDICAL CENTER | Age: 77
End: 2017-10-16

## 2017-10-16 NOTE — TELEPHONE ENCOUNTER
----- Message from Lelo Bryant sent at 10/16/2017  2:36 PM PDT -----  Regarding: RE:(No subject)  Contact: [phone]  Afternoon. I called [employer] and they said they had no request for a smaller portable oxgyen tank. They told me to tell you to fax the request to [phone]. I would need 2 batteries. 1-2hr and 1-5 hr as [place]. I'm sorry to be so much trouble.  ----- Message -----  From: Klaina Mandel, Med Ass't  Sent: 10/16/2017  9:40 AM PDT  To: Lelo Bryant  Subject: RE:(No subject)  You're welcome let me know if you need anything.     ----- Message -----     From: Lelo Bryant     Sent: 10/13/2017 11:16 PM PDT       To: Kalina Mandel Med Ass't  Subject: RE:(No subject)    Thank you. I will call them Monday to check.  ----- Message -----  From: Kalina Mandel, Med Ass't  Sent: 10/12/2017  9:55 AM PDT  To: Lelo Bryant  Subject: (No subject)  Order was sent to [employer]. Have you contacted them to see if they have received the order?

## 2017-10-16 NOTE — TELEPHONE ENCOUNTER
----- Message from Lelo Bryant sent at 10/16/2017  2:36 PM PDT -----  Regarding: RE:(No subject)  Contact: [phone]  Afternoon. I called [employer] and they said they had no request for a smaller portable oxgyen tank. They told me to tell you to fax the request to [phone]. I would need 2 batteries. 1-2hr and 1-5 hr as we live so far away. I'm sorry to be so much trouble.  ----- Message -----  From: Kalina Mandel, Med Ass't  Sent: 10/16/2017  9:40 AM PDT  To: Lelo Bryant  Subject: RE:(No subject)  You're welcome let me know if you need anything.     ----- Message -----     From: Lelo Bryant     Sent: 10/13/2017 11:16 PM PDT       To: Kalina Mandel Med Ass't  Subject: RE:(No subject)    Thank you. I will call them Monday to check.  ----- Message -----  From: Kalina Mandel, Med Ass't  Sent: 10/12/2017  9:55 AM PDT  To: Lelo Bryant  Subject: (No subject)  Order was sent to [employer]. Have you contacted them to see if they have received the order?

## 2017-10-20 ENCOUNTER — TELEPHONE (OUTPATIENT)
Dept: PULMONOLOGY | Facility: HOSPICE | Age: 77
End: 2017-10-20

## 2017-10-20 NOTE — TELEPHONE ENCOUNTER
Left message to Trish advised we are not the treating providers for her back conditions. And she will need to request letter through patients treating provider for the letter.   I spoke to the patient advised lacie is requesting letter regarding back pain. I informed the patient she will have to contact Trish and get her provider who treats her back pain to write letter. Patient aware

## 2017-10-20 NOTE — TELEPHONE ENCOUNTER
Patient is attempting to get a smaller POC due to her back condition.  [facility] is requesting a letter validating her back condition and treatment and therefore medically needing a smaller POC.  I have discussed this with Basilia Ramsay and she feels this needs to be addressed by the MD treating her for her back conditions.  Although we are the ordering provider for the o2 we can not validate her physical conditions requiring a smaller POC.  Please let Trish @ [facility] ([phone]) and pt know this request needs to be forwarded to the MD currently treating her back condition

## 2017-10-23 ENCOUNTER — TELEPHONE (OUTPATIENT)
Dept: SLEEP MEDICINE | Facility: MEDICAL CENTER | Age: 77
End: 2017-10-23

## 2017-10-23 NOTE — TELEPHONE ENCOUNTER
----- Message from Lelo Bryant sent at 10/20/2017  9:52 PM PDT -----  Regarding: RE:(No subject)  Contact: [phone]  Thank you Kalina. I have been approved for a smaller portable oxgyen unit. I really want to thank you for your help.  Lelo  ----- Message -----  From: Kalina Mandel, Med Ass't  Sent: 10/16/2017  4:12 PM PDT  To: Lelo Bryant  Subject: RE:(No subject)  You are welcome, and you are not. Thank you for following up with your order.     ----- Message -----     From: Lelo Bryant     Sent: 10/16/2017  3:23 PM PDT       To: Kalina Mandel, Med Ass't  Subject: RE:(No subject)    Thank you Kalina. Sorry to be such a pain.  ----- Message -----  From: Kalina Mandel, Med Ass't  Sent: 10/16/2017  3:06 PM PDT  To: Lelo Bryant  Subject: (No subject)  I just resent to order to the fax number you provided

## 2017-12-04 ENCOUNTER — APPOINTMENT (OUTPATIENT)
Dept: ADMISSIONS | Facility: MEDICAL CENTER | Age: 77
End: 2017-12-04
Attending: OTOLARYNGOLOGY
Payer: MEDICARE

## 2017-12-04 NOTE — OR NURSING
Phone preadmit done. Pt will bring oxygen and rescue inhaler on day of procedure. She will hold oral diabetic meds on procedure day and will take Atenolol, omeprazole, and norco as needed with small sip of water the morning of procedure as per anesth protocol. Pt is aware of npo instructions.

## 2017-12-08 ENCOUNTER — HOSPITAL ENCOUNTER (OUTPATIENT)
Facility: MEDICAL CENTER | Age: 77
End: 2017-12-08
Attending: OTOLARYNGOLOGY | Admitting: OTOLARYNGOLOGY
Payer: MEDICARE

## 2017-12-08 VITALS
HEART RATE: 70 BPM | RESPIRATION RATE: 16 BRPM | WEIGHT: 187.17 LBS | OXYGEN SATURATION: 96 % | HEIGHT: 67 IN | SYSTOLIC BLOOD PRESSURE: 121 MMHG | DIASTOLIC BLOOD PRESSURE: 64 MMHG | TEMPERATURE: 98 F | BODY MASS INDEX: 29.38 KG/M2

## 2017-12-08 LAB
ANION GAP SERPL CALC-SCNC: 9 MMOL/L (ref 0–11.9)
BASOPHILS # BLD AUTO: 0.3 % (ref 0–1.8)
BASOPHILS # BLD: 0.03 K/UL (ref 0–0.12)
BUN SERPL-MCNC: 16 MG/DL (ref 8–22)
CALCIUM SERPL-MCNC: 9.8 MG/DL (ref 8.5–10.5)
CHLORIDE SERPL-SCNC: 100 MMOL/L (ref 96–112)
CO2 SERPL-SCNC: 26 MMOL/L (ref 20–33)
CREAT SERPL-MCNC: 0.61 MG/DL (ref 0.5–1.4)
EOSINOPHIL # BLD AUTO: 0.35 K/UL (ref 0–0.51)
EOSINOPHIL NFR BLD: 3.7 % (ref 0–6.9)
ERYTHROCYTE [DISTWIDTH] IN BLOOD BY AUTOMATED COUNT: 49.2 FL (ref 35.9–50)
GFR SERPL CREATININE-BSD FRML MDRD: >60 ML/MIN/1.73 M 2
GLUCOSE SERPL-MCNC: 157 MG/DL (ref 65–99)
HCT VFR BLD AUTO: 40.7 % (ref 37–47)
HGB BLD-MCNC: 13.3 G/DL (ref 12–16)
IMM GRANULOCYTES # BLD AUTO: 0.03 K/UL (ref 0–0.11)
IMM GRANULOCYTES NFR BLD AUTO: 0.3 % (ref 0–0.9)
LYMPHOCYTES # BLD AUTO: 2.31 K/UL (ref 1–4.8)
LYMPHOCYTES NFR BLD: 24.2 % (ref 22–41)
MCH RBC QN AUTO: 29.7 PG (ref 27–33)
MCHC RBC AUTO-ENTMCNC: 32.7 G/DL (ref 33.6–35)
MCV RBC AUTO: 90.8 FL (ref 81.4–97.8)
MONOCYTES # BLD AUTO: 0.63 K/UL (ref 0–0.85)
MONOCYTES NFR BLD AUTO: 6.6 % (ref 0–13.4)
NEUTROPHILS # BLD AUTO: 6.18 K/UL (ref 2–7.15)
NEUTROPHILS NFR BLD: 64.9 % (ref 44–72)
NRBC # BLD AUTO: 0 K/UL
NRBC BLD AUTO-RTO: 0 /100 WBC
PLATELET # BLD AUTO: 164 K/UL (ref 164–446)
PMV BLD AUTO: 11.5 FL (ref 9–12.9)
POTASSIUM SERPL-SCNC: 4.2 MMOL/L (ref 3.6–5.5)
RBC # BLD AUTO: 4.48 M/UL (ref 4.2–5.4)
SODIUM SERPL-SCNC: 135 MMOL/L (ref 135–145)
WBC # BLD AUTO: 9.5 K/UL (ref 4.8–10.8)

## 2017-12-08 PROCEDURE — 160002 HCHG RECOVERY MINUTES (STAT): Performed by: OTOLARYNGOLOGY

## 2017-12-08 PROCEDURE — 160041 HCHG SURGERY MINUTES - EA ADDL 1 MIN LEVEL 4: Performed by: OTOLARYNGOLOGY

## 2017-12-08 PROCEDURE — 700111 HCHG RX REV CODE 636 W/ 250 OVERRIDE (IP)

## 2017-12-08 PROCEDURE — 160048 HCHG OR STATISTICAL LEVEL 1-5: Performed by: OTOLARYNGOLOGY

## 2017-12-08 PROCEDURE — 700101 HCHG RX REV CODE 250

## 2017-12-08 PROCEDURE — 502573 HCHG PACK, ENT: Performed by: OTOLARYNGOLOGY

## 2017-12-08 PROCEDURE — C1878 MATRL FOR VOCAL CORD: HCPCS | Performed by: OTOLARYNGOLOGY

## 2017-12-08 PROCEDURE — 501838 HCHG SUTURE GENERAL: Performed by: OTOLARYNGOLOGY

## 2017-12-08 PROCEDURE — 85025 COMPLETE CBC W/AUTO DIFF WBC: CPT

## 2017-12-08 PROCEDURE — 160009 HCHG ANES TIME/MIN: Performed by: OTOLARYNGOLOGY

## 2017-12-08 PROCEDURE — 500331 HCHG COTTONOID, SURG PATTIE: Performed by: OTOLARYNGOLOGY

## 2017-12-08 PROCEDURE — 160036 HCHG PACU - EA ADDL 30 MINS PHASE I: Performed by: OTOLARYNGOLOGY

## 2017-12-08 PROCEDURE — 160029 HCHG SURGERY MINUTES - 1ST 30 MINS LEVEL 4: Performed by: OTOLARYNGOLOGY

## 2017-12-08 PROCEDURE — 80048 BASIC METABOLIC PNL TOTAL CA: CPT

## 2017-12-08 PROCEDURE — 160035 HCHG PACU - 1ST 60 MINS PHASE I: Performed by: OTOLARYNGOLOGY

## 2017-12-08 DEVICE — KIT IMPLANT W/2 NEEDLES PROLARYN PLUS 1CC: Type: IMPLANTABLE DEVICE | Site: THROAT | Status: FUNCTIONAL

## 2017-12-08 RX ORDER — SODIUM CHLORIDE, SODIUM LACTATE, POTASSIUM CHLORIDE, CALCIUM CHLORIDE 600; 310; 30; 20 MG/100ML; MG/100ML; MG/100ML; MG/100ML
INJECTION, SOLUTION INTRAVENOUS CONTINUOUS
Status: DISCONTINUED | OUTPATIENT
Start: 2017-12-08 | End: 2017-12-08 | Stop reason: HOSPADM

## 2017-12-08 RX ORDER — LIDOCAINE AND PRILOCAINE 25; 25 MG/G; MG/G
1 CREAM TOPICAL
Status: DISCONTINUED | OUTPATIENT
Start: 2017-12-08 | End: 2017-12-08 | Stop reason: HOSPADM

## 2017-12-08 RX ORDER — LIDOCAINE HYDROCHLORIDE 10 MG/ML
0.5 INJECTION, SOLUTION INFILTRATION; PERINEURAL
Status: DISCONTINUED | OUTPATIENT
Start: 2017-12-08 | End: 2017-12-08 | Stop reason: HOSPADM

## 2017-12-08 RX ADMIN — SODIUM CHLORIDE, SODIUM LACTATE, POTASSIUM CHLORIDE, CALCIUM CHLORIDE 1000 ML: 600; 310; 30; 20 INJECTION, SOLUTION INTRAVENOUS at 09:45

## 2017-12-08 ASSESSMENT — PAIN SCALES - GENERAL
PAINLEVEL_OUTOF10: 0

## 2017-12-08 NOTE — OP REPORT
DATE OF SERVICE:  12/08/2017    PREOPERATIVE DIAGNOSES:  1.  Hoarseness.  2.  Presbyphonia.  3.  Left true vocal cord paralysis.    POSTOPERATIVE DIAGNOSES:  1.  Hoarseness.  2.  Presbyphonia.  3.  Left true vocal cord paralysis.    PROCEDURE PERFORMED:  Direct laryngoscopy with Prolaryn permanent prosthesis   placement.    OPERATIVE FINDINGS:  A 0.3 mL was placed in the right mid vocal cord and an   additional 0.4 mL was placed on the left side, posterior and mid cord with 0.2   be in posterior and 0.2 be in mid cord.    DESCRIPTION OF PROCEDURE:  Patient was intubated by anesthesia.  Once adequate   levels of anesthesia were achieved, head of bed was rotated at 90 degrees   towards the surgeon.  Head wrap, eye protection and upper gum protector was   placed.  Laryngoscope was inserted into the oral cavity and brought down to   the level of the glottis where patient was placed under suspension and then   under endoscopic visualization, bilateral true vocal cords were injected today   without any difficulty.  Pictures were taken before and after.  Patient was   then taken off of suspension.  The upper gum protector laryngoscope were   removed along with the head wrap and patient was turned back over to   anesthesia for emergence.    COMPLICATIONS:  None.    SPONGE COUNT:  Correct.    IV FLUIDS GIVEN:  100 mL lactated ringer per anesthesia.    ESTIMATED BLOOD LOSS:  Less than 1 mL.    ANESTHESIA TYPE:  General.       ____________________________________     Yosvany Gilmore MD    JFENG / NTS    DD:  12/08/2017 10:54:28  DT:  12/08/2017 11:07:52    D#:  [id number]  Job#:  [id number]

## 2017-12-08 NOTE — OR NURSING
1104  RECEIVED PATIENT FROM OR.  REPORT FROM DR. IBARRA.  NO AIRWAY IN PLACE.  RESPIRATIONS ARE EVEN AND UNLABORED.  NO DISTRESS NOTED.     1212  GRAND DAUGHTER RENITA UPDATED IN WAITING ROOM.      1311  PATIENT AWAKE.  DENIES PAIN.  TAKING PO WITHOUT DIFFICULTY.    1316  GRAND DAUGHTER RENITA TO BEDSIDE.     1400 PT DRESSED AND READY TO DISCHARGE HOME. IV REMOVED. INSTRUCTIONS GIVEN . WHEELCHAIR OUT PROVIDED.

## 2017-12-08 NOTE — DISCHARGE INSTRUCTIONS
ACTIVITY: Rest and take it easy for the first 24 hours.  A responsible adult is recommended to remain with you during that time.  It is normal to feel sleepy.  We encourage you to not do anything that requires balance, judgment or coordination.    MILD FLU-LIKE SYMPTOMS ARE NORMAL. YOU MAY EXPERIENCE GENERALIZED MUSCLE ACHES, THROAT IRRITATION, HEADACHE AND/OR SOME NAUSEA.    FOR 24 HOURS DO NOT:  Drive, operate machinery or run household appliances.  Drink beer or alcoholic beverages.   Make important decisions or sign legal documents.    SPECIAL INSTRUCTIONS: *SEE LARYNGOSCOPY INSTRUCTION SHEET  NO VOICE FOR 24 HOURS**    DIET: To avoid nausea, slowly advance diet as tolerated, avoiding spicy or greasy foods for the first day.  Add more substantial food to your diet according to your physician's instructions.  Babies can be fed formula or breast milk as soon as they are hungry.  INCREASE FLUIDS AND FIBER TO AVOID CONSTIPATION.    SURGICAL DRESSING/BATHING: *MAY SHOWER TOMORROW**    FOLLOW-UP APPOINTMENT:  A follow-up appointment should be arranged with your doctor in **FOLLOW UP WITH DR. FAY*; call to schedule.    You should CALL YOUR PHYSICIAN if you develop:  Fever greater than 101 degrees F.  Pain not relieved by medication, or persistent nausea or vomiting.  Excessive bleeding (blood soaking through dressing) or unexpected drainage from the wound.  Extreme redness or swelling around the incision site, drainage of pus or foul smelling drainage.  Inability to urinate or empty your bladder within 8 hours.  Problems with breathing or chest pain.    You should call 911 if you develop problems with breathing or chest pain.  If you are unable to contact your doctor or surgical center, you should go to the nearest emergency room or urgent care center.  Physician's telephone #: *DR. [phone]**    If any questions arise, call your doctor.  If your doctor is not available, please feel free to call the Surgical  Center at [phone].  The Center is open Monday through Friday from 7AM to 7PM.  You can also call the HEALTH HOTLINE open 24 hours/day, 7 days/week and speak to a nurse at [phone], or toll free at [phone].    A registered nurse may call you a few days after your surgery to see how you are doing after your procedure.    MEDICATIONS: Resume taking daily medication.  Take prescribed pain medication with food.  If no medication is prescribed, you may take non-aspirin pain medication if needed.  PAIN MEDICATION CAN BE VERY CONSTIPATING.  Take a stool softener or laxative such as senokot, pericolace, or milk of magnesia if needed.    Prescription given for **NONE.  Last pain medication given at **_____________________________*.    If your physician has prescribed pain medication that includes Acetaminophen (Tylenol), do not take additional Acetaminophen (Tylenol) while taking the prescribed medication.    Depression / Suicide Risk    As you are discharged from this [facility] facility, it is important to learn how to keep safe from harming yourself.    Recognize the warning signs:  · Abrupt changes in personality, positive or negative- including increase in energy   · Giving away possessions  · Change in eating patterns- significant weight changes-  positive or negative  · Change in sleeping patterns- unable to sleep or sleeping all the time   · Unwillingness or inability to communicate  · Depression  · Unusual sadness, discouragement and loneliness  · Talk of wanting to die  · Neglect of personal appearance   · Rebelliousness- reckless behavior  · Withdrawal from people/activities they love  · Confusion- inability to concentrate     If you or a loved one observes any of these behaviors or has concerns about self-harm, here's what you can do:  · Talk about it- your feelings and reasons for harming yourself  · Remove any means that you might use to hurt yourself (examples: pills, rope, extension  cords, firearm)  · Get professional help from the community (Mental Health, Substance Abuse, psychological counseling)  · Do not be alone:Call your Safe Contact- someone whom you trust who will be there for you.  · Call your local CRISIS HOTLINE 891-0624 or 047-458-2606  · Call your local Children's Mobile Crisis Response Team [place] (118) 109-4596 or www.ThumbAd  · Call the toll free National Suicide Prevention Hotlines   · National Suicide Prevention Lifeline 008-328-UCSY (5397)  · National Hope Line Network 800-SUICIDE (807-8791)

## 2017-12-27 ENCOUNTER — TELEPHONE (OUTPATIENT)
Dept: SLEEP MEDICINE | Facility: MEDICAL CENTER | Age: 77
End: 2017-12-27

## 2018-01-18 ENCOUNTER — HOSPITAL ENCOUNTER (OUTPATIENT)
Dept: RADIOLOGY | Facility: MEDICAL CENTER | Age: 78
End: 2018-01-18
Attending: NURSE PRACTITIONER
Payer: MEDICARE

## 2018-01-18 DIAGNOSIS — R91.8 PULMONARY NODULES: ICD-10-CM

## 2018-01-18 PROCEDURE — 71250 CT THORAX DX C-: CPT

## 2018-02-02 ENCOUNTER — OFFICE VISIT (OUTPATIENT)
Dept: PULMONOLOGY | Facility: HOSPICE | Age: 78
End: 2018-02-02
Payer: MEDICARE

## 2018-02-02 VITALS
WEIGHT: 187 LBS | TEMPERATURE: 97.2 F | OXYGEN SATURATION: 92 % | BODY MASS INDEX: 29.35 KG/M2 | SYSTOLIC BLOOD PRESSURE: 112 MMHG | HEIGHT: 67 IN | HEART RATE: 92 BPM | DIASTOLIC BLOOD PRESSURE: 68 MMHG | RESPIRATION RATE: 16 BRPM

## 2018-02-02 DIAGNOSIS — J44.9 CHRONIC OBSTRUCTIVE PULMONARY DISEASE, UNSPECIFIED COPD TYPE (HCC): ICD-10-CM

## 2018-02-02 DIAGNOSIS — R09.82 POSTNASAL DRIP: ICD-10-CM

## 2018-02-02 PROCEDURE — 99213 OFFICE O/P EST LOW 20 MIN: CPT | Performed by: NURSE PRACTITIONER

## 2018-02-02 RX ORDER — BUDESONIDE AND FORMOTEROL FUMARATE DIHYDRATE 160; 4.5 UG/1; UG/1
2 AEROSOL RESPIRATORY (INHALATION) 2 TIMES DAILY
Qty: 3 INHALER | Refills: 3 | Status: SHIPPED | OUTPATIENT
Start: 2018-02-02 | End: 2019-08-02 | Stop reason: SDUPTHER

## 2018-02-02 RX ORDER — FLUTICASONE PROPIONATE 50 MCG
2 SPRAY, SUSPENSION (ML) NASAL DAILY
Qty: 3 BOTTLE | Refills: 3 | Status: SHIPPED | OUTPATIENT
Start: 2018-02-02 | End: 2019-08-02 | Stop reason: SDUPTHER
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
## 2018-02-02 NOTE — PATIENT INSTRUCTIONS
1) Continue Symbicort 160/4.5 and rescue inhaler  2) Continue Fluticasone nasal spray. Consider adding allergy pill for post nasal drip  3) Start using Incruse 1 puff, once a day  4) Vaccines: Up to date with flu, Prevnar 13,  Pneumovax 23   5) CAT Scan due Jan 2019  6) Encouraged daily exercise within limit  7) Continue 24/7 oxygen at 2-3L   8) Return in about 6 months (around 8/2/2018) for review of symptoms, if not sooner, follow up with GOLDEN Heller.

## 2018-08-03 ENCOUNTER — OFFICE VISIT (OUTPATIENT)
Dept: PULMONOLOGY | Facility: HOSPICE | Age: 78
End: 2018-08-03
Payer: MEDICARE

## 2018-08-03 VITALS
HEIGHT: 67 IN | WEIGHT: 188 LBS | DIASTOLIC BLOOD PRESSURE: 82 MMHG | TEMPERATURE: 97.5 F | HEART RATE: 90 BPM | BODY MASS INDEX: 29.51 KG/M2 | RESPIRATION RATE: 18 BRPM | SYSTOLIC BLOOD PRESSURE: 130 MMHG | OXYGEN SATURATION: 88 %

## 2018-08-03 DIAGNOSIS — R91.8 PULMONARY NODULES: ICD-10-CM

## 2018-08-03 DIAGNOSIS — J38.00 PARALYZED VOCAL CORDS: ICD-10-CM

## 2018-08-03 DIAGNOSIS — Z99.81 OXYGEN DEPENDENT: ICD-10-CM

## 2018-08-03 DIAGNOSIS — J43.9 PULMONARY EMPHYSEMA, UNSPECIFIED EMPHYSEMA TYPE (HCC): ICD-10-CM

## 2018-08-03 PROCEDURE — 94761 N-INVAS EAR/PLS OXIMETRY MLT: CPT | Performed by: NURSE PRACTITIONER

## 2018-08-03 PROCEDURE — 99213 OFFICE O/P EST LOW 20 MIN: CPT | Performed by: NURSE PRACTITIONER

## 2018-08-03 RX ORDER — AZITHROMYCIN 250 MG/1
TABLET, FILM COATED ORAL
Qty: 6 TAB | Refills: 0 | Status: SHIPPED | OUTPATIENT
Start: 2018-08-03 | End: 2019-01-09 | Stop reason: SDUPTHER

## 2018-08-03 RX ORDER — METHYLPREDNISOLONE 4 MG/1
TABLET ORAL
Qty: 21 TAB | Refills: 0 | Status: SHIPPED | OUTPATIENT
Start: 2018-08-03 | End: 2019-01-09 | Stop reason: SDUPTHER

## 2018-08-03 NOTE — PATIENT INSTRUCTIONS
1) Continue Symbicort 160/4.5 and rescue inhaler  2) Continue Fluticasone nasal spray. Consider adding allergy pill for post nasal drip  3) Continue Incruse 1 puff, once a day  4) Vaccines: Up to date with flu, Prevnar 13,  Pneumovax 23   5) CAT Scan due Jan 2019    6) Encouraged daily exercise within limit  7) Continue 24/7 oxygen at 2-3L   8) Zpack and medrol on hand for emergency  9) Return in about 5 months (around 1/3/2019) for review of symptoms, if not sooner, follow up with GOLDEN Heller, CAT scan results, pulmonary function results.

## 2018-08-03 NOTE — PROGRESS NOTES
CC:  Here for f/u pulmonary issues as listed below    HPI:   Mrs. Bryant is here for followup Emphysema.      She has a history of being exposed to tuberculosis when she was young around the age of 7 by an uncle. She has family members who were also around the uncle that has shown positive for TB. Patient did not complete the full ILD panel, RA was negative.  PFTs from 3/2017 are stable from last year and indicate a Fev1 of 1.81L or 78% predicted without bronchodilator response, Fev1/FVC ratio of 73, DLCO 76%.  She has had no hospitalizations or respiratory infection since we last saw her. She did have surgery completed by ENT Dr. Gilmore to help with paralyzed vocal cord, along with a repeat surgery which she finds is only somewhat beneficial.  Her voice is cracking today. Next step is speech pathology, but unable to drive that far.     CAT scan was completed to rule out ILD completed 10- that showed a new 9 mm right upper lobe nodule, stable 3 mm left upper lobe subpleural pulmonary nodule, previously suspected 5 mm right upper lobe nodule could have represented a vessel identified on and, new appearance 3 mm superior segment right lower lobe pulmonary nodule, emphysema, probable pulmonary artery hypertension. We completed a PET scan on 11- that showed no associated elevated activity with the 9 mm right upper lobe pulmonary nodule and 3 mm left upper lobe nodule. April 2017 cat scan showed stable nodules and emphysema changes. Updated cat scan from 1/18/2018 shows stability. We will follow up in one more year for stability.     She is compliant using Symbicort 160/4.5 2 puffs, BID with mouth rinse and was started on Incruse last OV with improvement of overall dyspnea. She uses Xopenex anytime she exerts outside her home.  She uses fluticasone nasal spray 2 times a day with benefit. She is compliant using 3 LPM of oxygen 24/7. She required updated qualification of oxygen.  On room air at rest she was  88%.  She was placed on 2 L of oxygen at rest she was 97% and desaturated to 80%.  She was placed on 3 L of oxygen at rest was 94% and desaturated to 93%.  She will continue to benefit from the use of oxygen 24-7.  She complains of occasional cough with white sputum, palpitations that are stable according to her. Mucinex with benefit.  She denies wheezing, hemoptysis, fevers or chills, acid reflux, nasal congestion, morning headaches, snoring, orthopnea.            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   • Postnasal drip 03/30/2017   • Pulmonary nodules 12/01/2016   • Pulmonary emphysema (HCC) 12/01/2016       Past Medical History:   Diagnosis Date   • Breath shortness    • CAD (coronary artery disease)    • Cancer (HCC)     cervical cancer   • Cataract     bilat IOL   • Dental disorder     full dentures   • Diabetes (HCC)     oral meds   • Heart burn    • High cholesterol    • Hypercholesterolemia    • Hypertension    • Oxygen dependent     3 LTR cont   • Pain     back   • Pneumonia 1985   • Pulmonary embolism (HCC)    • Tonsillitis        Past Surgical History:   Procedure Laterality Date   • LARYNGOSCOPY Bilateral 12/8/2017    Procedure: LARYNGOSCOPY - DIRECT;  Surgeon: Yosvany Gilmore M.D.;  Location: SURGERY SAME DAY [facility];  Service: Ent   • VOCAL CORD INJECTION Bilateral 12/8/2017    Procedure: VOCAL CORD INJECTION - PROLARYNX PERMANENT;  Surgeon: Yosvany Gilmore M.D.;  Location: SURGERY SAME DAY [facility];  Service: Ent   • THYROPLASTY Left 6/30/2017    Procedure: THYROPLASTY-JIMÉNEZ IMPLANT FEMALE ;  Surgeon: Yosvany Gilmore M.D.;  Location: SURGERY SAME DAY [facility];  Service:    • LARYNGOSCOPY N/A 6/30/2017    Procedure: LARYNGOSCOPY-FLEXIBLE NASOLARYNGOSCOPY;  Surgeon: oYsvany Gilmore M.D.;  Location: SURGERY SAME DAY [facility];  Service:    • HYSTERECTOMY LAPAROSCOPY     • LAMINOTOMY     • OTHER  ABDOMINAL SURGERY      lap mayra   • OTHER ORTHOPEDIC SURGERY      R knee/scope   • PRIMARY C SECTION         History reviewed. No pertinent family history.    Social History   Substance Use Topics   • Smoking status: Former Smoker     Packs/day: 2.00     Years: 20.00     Types: Cigarettes     Quit date: 4/15/1993   • Smokeless tobacco: Never Used   • Alcohol use No       Current Outpatient Prescriptions   Medication Sig Dispense Refill   • azithromycin (ZITHROMAX) 250 MG Tab Take 2 tablets on day 1, then take 1 tablet a day for 4 days. 6 Tab 0   • MethylPREDNISolone (MEDROL DOSEPAK) 4 MG Tablet Therapy Pack Take as directed. 21 Tab 0   • budesonide-formoterol (SYMBICORT) 160-4.5 MCG/ACT Aerosol Inhale 2 Puffs by mouth 2 Times a Day. 3 Inhaler 3   • fluticasone (FLONASE) 50 MCG/ACT nasal spray Spray 2 Sprays in nose every day. 1-2 sprays each nostril daily. 3 Bottle 3   • Umeclidinium Bromide (INCRUSE ELLIPTA) 62.5 MCG/INH AEROSOL POWDER, BREATH ACTIVATED Inhale 1 Puff by mouth every day. 1 Each 0   • OXYGEN-HELIUM INH Inhale 3 L by mouth every day.     • Diclofenac Sodium (VOLTAREN) 1 % Gel Apply  to skin as directed as needed.     • LYRICA 200 MG capsule 200 mg 3 times a day.     • metformin (GLUCOPHAGE) 500 MG Tab Take 500 mg by mouth 2 times a day, with meals.     • glipiZIDE SR (GLUCOTROL) 5 MG TABLET SR 24 HR Take 5 mg by mouth every day.     • gemfibrozil (LOPID) 600 MG Tab Take 600 mg by mouth 2 times a day.     • atenolol (TENORMIN) 25 MG Tab Take 25 mg by mouth 2 times a day.     • estradiol (ESTRACE) 2 MG Tab Take 2 mg by mouth every day.     • diazepam (VALIUM) 5 MG Tab Take 5 mg by mouth every 6 hours as needed for Anxiety.     • hydrocodone/acetaminophen (NORCO)  MG Tab Take 1-2 Tabs by mouth 2 Times a Day.     • omeprazole (PRILOSEC) 20 MG delayed-release capsule Take 20 mg by mouth every day.     • cyclobenzaprine (FLEXERIL) 5 MG tablet Take 5 mg by mouth 3 times a day as needed.     •  "ondansetron (ZOFRAN ODT) 4 MG TABLET DISPERSIBLE Take 1 Tab by mouth every 8 hours as needed for Nausea/Vomiting. 20 Tab 0   • levalbuterol (XOPENEX HFA) 45 MCG/ACT inhaler Inhale 1-2 Puffs by mouth every four hours as needed for Shortness of Breath. 3 Inhaler 1   • acetaminophen/caffeine/butalbital 325-40-50 mg (FIORICET) -40 MG Tab Take 1 Tab by mouth 1 time daily as needed.       No current facility-administered medications for this visit.           Allergies: Demerol          ROS   Gen: Denies fever, chills, unintentional weight loss, fatigue, night sweats  E/N/T: Denies nasal congestion, ear pain  Resp:Denies wheezing,  hemoptysis  CV: Denies chest pain, chest tightness, palpitations  Sleep:Denies morning headache  Neuro: Denies frequent headaches, weakness, dizziness  GI: Denies acid reflux, N/V  See HPI.  All other systems reviewed and negative          Vital signs for this encounter:  Vitals:    08/03/18 1036   Height: 1.689 m (5' 6.5\")   Weight: 85.3 kg (188 lb)   Weight % change since last entry.: 0 %   BP: 130/82   Pulse: 90   BMI (Calculated): 29.89   Resp: 18   Temp: 36.4 °C (97.5 °F)   O2 sat % room air: 88 %   O2 sat % on O2: 97 %   O2 Flow Rate (L/min): 2                 Physical Exam:   Appearance: well developed, well nourished, no acute distress.   Eyes: PERRL, EOM intact, sclere white, conjunctiva moist.  Ears: no lesions or deformities.  Hearing: grossly intact.  Nose: no lesions or deformities.  Dentition: good dentition.   Oropharynx: tongue normal, posterior pharynx without erythema or exudate.  Neck: supple, trachea midline, no masses.  Respiratory effort: no intercostal retractions or use of accessory muscles.  Lung auscultation: Bilateral diminished   Heart auscultation: no murmur, rub, or gallop   Extremities: no cyanosis or edema.  Abdomen: soft, non-tender, no masses.  Gait and station: without difficulty   Digits and Nails: no clubbing, cyanosis, petechiae, or nodes.  Cranial " nerves: grossly normal.  Motor: no focal deficits observed.   Skin: no rashes, lesions, or ulcers noted.  Orientation: oriented to time, place, and person.  Mood and affect: mood and affect appropriate, normal interaction with examiner.      Assessment   1. Pulmonary emphysema, unspecified emphysema type (HCC)  AMB MULTIPLE OXIMETRY    CT-CHEST (THORAX) W/O    AMB PULMONARY FUNCTION TEST/LAB    DME O2 NEW SET UP   2. Pulmonary nodules  DME O2 NEW SET UP   3. Paralyzed vocal cords     4. Oxygen dependent  DME O2 NEW SET UP       Patient was seen for 25 minutes, more than 50% of time spent in face to face review, counseling, and arranging future evaluation and follow up of medical conditions and care relating to emphysema, medication management, multi-ox results. Patient is clinically stable and will have the following changes per plan.     PLAN:   Patient Instructions   1) Continue Symbicort 160/4.5 and rescue inhaler  2) Continue Fluticasone nasal spray. Consider adding allergy pill for post nasal drip  3) Continue Incruse 1 puff, once a day  4) Vaccines: Up to date with flu, Prevnar 13,  Pneumovax 23   5) CAT Scan due Jan 2019    6) Encouraged daily exercise within limit  7) Continue 24/7 oxygen at 2-3L   8) Zpack and medrol on hand for emergency  9) Return in about 5 months (around 1/3/2019) for review of symptoms, if not sooner, follow up with GOLDEN Heller, CAT scan results and PFT

## 2018-08-03 NOTE — PROCEDURES
Multi-Ox Readings  Multi Ox #1 Room air   O2 sat % at rest 88   O2 sat % on exertion     O2 sat average on exertion     Multi Ox #2 3 LPM (2LPM 97%-80% )   O2 sat % at rest 94   O2 sat % on exertion 93   O2 sat average on exertion       Oxygen Use 2 (2-3 LPM )   Oxygen Frequency 24/7   Duration of need     Is the patient mobile within the home?     CPAP Use?     BIPAP Use?     Servo Titration

## 2018-09-04 ENCOUNTER — TELEPHONE (OUTPATIENT)
Dept: SLEEP MEDICINE | Facility: MEDICAL CENTER | Age: 78
End: 2018-09-04

## 2018-09-04 NOTE — TELEPHONE ENCOUNTER
Last seen 8/3/18 GOLDEN Buckley   Next OV 1/9/19 GOLDEN Platt   Diagnosis: Emphysema      Via mychart Pt given address to pulmonary clinic informed her to Attention it to Me ( [place]). [place] out of town

## 2018-09-04 NOTE — TELEPHONE ENCOUNTER
----- Message from Lelo Bryant sent at 9/2/2018  4:49 PM PDT -----  Regarding: Non-Urgent Medical Question  Contact: [phone]  Hello. I hope you had a great Labor Day. I have a document from [employer] about our electrical service. It needs to be filled out and sent back to them. Can you give me an address to send it directly to you instead of having to go thru [employer]. Sorry for the inconvenience.  Lelo

## 2018-09-14 ENCOUNTER — TELEPHONE (OUTPATIENT)
Dept: PULMONOLOGY | Facility: HOSPICE | Age: 78
End: 2018-09-14

## 2018-09-14 NOTE — TELEPHONE ENCOUNTER
Mail arrived from [employer] to have [employer] paperwork filled out. It is located in [place] Mailbox at the .

## 2019-01-09 ENCOUNTER — NON-PROVIDER VISIT (OUTPATIENT)
Dept: PULMONOLOGY | Facility: HOSPICE | Age: 79
End: 2019-01-09
Payer: MEDICARE

## 2019-01-09 ENCOUNTER — TELEPHONE (OUTPATIENT)
Dept: PULMONOLOGY | Facility: HOSPICE | Age: 79
End: 2019-01-09

## 2019-01-09 ENCOUNTER — OFFICE VISIT (OUTPATIENT)
Dept: PULMONOLOGY | Facility: HOSPICE | Age: 79
End: 2019-01-09
Payer: MEDICARE

## 2019-01-09 VITALS
OXYGEN SATURATION: 94 % | WEIGHT: 187 LBS | HEIGHT: 66 IN | BODY MASS INDEX: 30.05 KG/M2 | DIASTOLIC BLOOD PRESSURE: 60 MMHG | RESPIRATION RATE: 18 BRPM | HEART RATE: 87 BPM | SYSTOLIC BLOOD PRESSURE: 122 MMHG | TEMPERATURE: 97.5 F

## 2019-01-09 VITALS — BODY MASS INDEX: 29.73 KG/M2 | WEIGHT: 187 LBS

## 2019-01-09 DIAGNOSIS — Z99.81 OXYGEN DEPENDENT: ICD-10-CM

## 2019-01-09 DIAGNOSIS — R91.8 PULMONARY NODULES: ICD-10-CM

## 2019-01-09 DIAGNOSIS — J43.9 PULMONARY EMPHYSEMA, UNSPECIFIED EMPHYSEMA TYPE (HCC): ICD-10-CM

## 2019-01-09 DIAGNOSIS — R09.82 POSTNASAL DRIP: ICD-10-CM

## 2019-01-09 DIAGNOSIS — J38.00 PARALYZED VOCAL CORDS: ICD-10-CM

## 2019-01-09 PROCEDURE — 94726 PLETHYSMOGRAPHY LUNG VOLUMES: CPT | Performed by: INTERNAL MEDICINE

## 2019-01-09 PROCEDURE — 99214 OFFICE O/P EST MOD 30 MIN: CPT | Performed by: NURSE PRACTITIONER

## 2019-01-09 PROCEDURE — 94729 DIFFUSING CAPACITY: CPT | Performed by: INTERNAL MEDICINE

## 2019-01-09 PROCEDURE — 94060 EVALUATION OF WHEEZING: CPT | Performed by: INTERNAL MEDICINE

## 2019-01-09 RX ORDER — METHYLPREDNISOLONE 4 MG/1
TABLET ORAL
Qty: 21 TAB | Refills: 0 | Status: SHIPPED | OUTPATIENT
Start: 2019-01-09 | End: 2020-03-13

## 2019-01-09 RX ORDER — AZITHROMYCIN 250 MG/1
TABLET, FILM COATED ORAL
Qty: 6 TAB | Refills: 0 | Status: SHIPPED | OUTPATIENT
Start: 2019-01-09 | End: 2022-03-22

## 2019-01-09 ASSESSMENT — PULMONARY FUNCTION TESTS
FVC_LLN: 2.50
FEV1/FVC: 76
FEV1/FVC_PERCENT_LLN: 62
FEV1_PERCENT_PREDICTED: 81
FVC_PERCENT_PREDICTED: 79
FEV1/FVC: 76
FVC: 2.37
FEV1/FVC_PERCENT_CHANGE: 0
FEV1_PREDICTED: 2.24
FEV1/FVC_PERCENT_PREDICTED: 103
FEV1_PERCENT_PREDICTED: 80
FEV1/FVC: 76
FVC_PREDICTED: 2.99
FVC: 2.4
FEV1/FVC_PREDICTED: 74
FEV1/FVC_PERCENT_PREDICTED: 75
FEV1/FVC: 75.83
FEV1: 1.81
FEV1_PERCENT_CHANGE: 0
FVC_PERCENT_PREDICTED: 80
FEV1/FVC_PERCENT_PREDICTED: 101
FEV1_PERCENT_CHANGE: 1
FEV1_LLN: 1.87
FEV1/FVC_PERCENT_PREDICTED: 101
FEV1: 1.82
FEV1/FVC_PERCENT_CHANGE: 0
FEV1/FVC_PERCENT_PREDICTED: 102

## 2019-01-09 NOTE — PATIENT INSTRUCTIONS
1) Continue Symbicort 160/4.5, and Incruse  2) Continue Fluticasone nasal spray.  Add sinus rinse.   3) Continue rescue inhaler as needed  4) Vaccines: Up to date with flu, Prevnar 13,  Pneumovax 23   5) CAT Scan due Jan 2019  - will check mychart for results  6) Encouraged daily exercise within limit  7) Continue 24/7 oxygen at 2-3L   8) Zpack and medrol on hand for emergency  9) Return in about 6 months (around 7/9/2019) for follow up with GOLDEN Heller, if not sooner, review of symptoms, 6 min walk.

## 2019-01-09 NOTE — PROCEDURES
Technician: TEODORO Robert    Technician Comment:  Good patient effort & cooperation.  The results of this test meet the ATS/ERS standards for acceptability & reproducibility.  Test was performed on the [employer] Pro Body Plethysmograph-Elite DX system.  Predicted values were [facility]-3 for spirometry, [facility] for DLCO, ITS for Lung Volumes.  The DLCO was uncorrected for Hgb.  A bronchodilator of Ventolin HFA -2puffs via spacer administered.  DLCO performed during dilation period.    Interpretation:    Spirometry showed FVC of 2.40, 80% of predicted.  FEV1 was 1.82 L, 81% predicted.  FEV1/FVC ratio was normal at 76%.  There was no significant response to bronchodilators.    Lung volumes showed normal residual volume at 103% of predicted and normal total lung capacity at 90% of predicted.    Diffusion capacity was decreased to 70% of predicted.    Impression:  The patient has low normal FVC and FEV1 with normal ratio this could be secondary to body habitus/obesity with listed BMI of 30.2.  The patient also has decreased diffusion capacity of unclear etiology could be secondary to the patient listed diagnosis of emphysema.  Clinical correlation is required.

## 2019-01-09 NOTE — TELEPHONE ENCOUNTER
----- Message from DANIEL Rose sent at 1/8/2019  9:39 PM PST -----  She really should be seen after the CT results. Can she be rescheduled until after the CT is completed?   ----- Message -----  From: Kalina Mandel, Med Ass't  Sent: 1/8/2019   9:01 AM  To: DANIEL Rose    Pt scheduled to see 1/9/19 coming in with PFt testing. Per last Visit CT scan. Ct scan schedule after OV 1/31/19. Would you like me to keep her on the schedule and then call her with CT results or reschedule after CT scan.

## 2019-01-09 NOTE — PROGRESS NOTES
CC:  Here for f/u pulmonary issues as listed below    HPI:   Mrs. Bryant is here for followup Emphysema and PFT results.       She has a history of being exposed to tuberculosis when she was young around the age of 7 by an uncle. She has family members who were also around the uncle that has shown positive for TB. Patient did not complete the full ILD panel, RA was negative.  PFTs from 1/2019 in comparison to 2017 are stable and indicate a Fev1 of 1.81L or 80% predicted without bronchodilator response, Fev1/FVC ratio of 76, DLCO 70%.    Former smoker, 40-pack-year history, quit in 1993.  She has had no hospitalizations or respiratory infection since we last saw her. She did have surgery completed by ENT Dr. Gilmore to help with paralyzed vocal cord, along with a repeat surgery which she finds is only somewhat beneficial.  Her voice continues to crackle. Next step is speech pathology, but unable to drive that far.     CAT scan was completed to rule out ILD completed 10- that showed a new 9 mm right upper lobe nodule, stable 3 mm left upper lobe subpleural pulmonary nodule, previously suspected 5 mm right upper lobe nodule could have represented a vessel identified on and, new appearance 3 mm superior segment right lower lobe pulmonary nodule, emphysema, probable pulmonary artery hypertension. We completed a PET scan on 11- that showed no associated elevated activity with the 9 mm right upper lobe pulmonary nodule and 3 mm left upper lobe nodule. April 2017 cat scan showed stable nodules and emphysema changes. Updated cat scan from 1/18/2018 shows stability.  She has not completed updated CAT scan at this time but has not scheduled in approximately 3 weeks.    She is compliant using Symbicort 160/4.5 2 puffs, BID with mouth rinse, Incruse. She uses Xopenex anytime she exerts outside her home.  She uses fluticasone nasal spray 2 times a day with benefit. She is compliant using 3 LPM of oxygen 24/7.    She  reports stable dyspnea.  She complains of occasional cough with white sputum, that is most likely related to postnasal drip. Mucinex with benefit.  She denies wheezing, hemoptysis, fevers or chills, acid reflux, nasal congestion, morning headaches, snoring, orthopnea.                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   • Postnasal drip 03/30/2017   • Pulmonary nodules 12/01/2016   • Pulmonary emphysema (HCC) 12/01/2016       Past Medical History:   Diagnosis Date   • Breath shortness    • CAD (coronary artery disease)    • Cancer (HCC)     cervical cancer   • Cataract     bilat IOL   • Dental disorder     full dentures   • Diabetes (HCC)     oral meds   • Heart burn    • High cholesterol    • Hypercholesterolemia    • Hypertension    • Oxygen dependent     3 LTR cont   • Pain     back   • Pneumonia 1985   • Pulmonary embolism (HCC)    • Tonsillitis        Past Surgical History:   Procedure Laterality Date   • LARYNGOSCOPY Bilateral 12/8/2017    Procedure: LARYNGOSCOPY - DIRECT;  Surgeon: Yosvany Gilmore M.D.;  Location: SURGERY SAME DAY [facility];  Service: Ent   • VOCAL CORD INJECTION Bilateral 12/8/2017    Procedure: VOCAL CORD INJECTION - PROLARYNX PERMANENT;  Surgeon: Yosvany Gilmore M.D.;  Location: SURGERY SAME DAY [facility];  Service: Ent   • THYROPLASTY Left 6/30/2017    Procedure: THYROPLASTY-JIMÉNEZ IMPLANT FEMALE ;  Surgeon: Yosvany Gilmore M.D.;  Location: SURGERY SAME DAY [facility];  Service:    • LARYNGOSCOPY N/A 6/30/2017    Procedure: LARYNGOSCOPY-FLEXIBLE NASOLARYNGOSCOPY;  Surgeon: Yosvany Gilmore M.D.;  Location: SURGERY SAME DAY [facility];  Service:    • HYSTERECTOMY LAPAROSCOPY     • LAMINOTOMY     • OTHER ABDOMINAL SURGERY      lap mayra   • OTHER ORTHOPEDIC SURGERY      R knee/scope   • PRIMARY C SECTION         History reviewed. No pertinent family history.    Social History   Substance Use  Topics   • Smoking status: Former Smoker     Packs/day: 2.00     Years: 20.00     Types: Cigarettes     Quit date: 4/15/1993   • Smokeless tobacco: Never Used   • Alcohol use No       Current Outpatient Prescriptions   Medication Sig Dispense Refill   • azithromycin (ZITHROMAX) 250 MG Tab Take 2 tablets on day 1, then take 1 tablet a day for 4 days. 6 Tab 0   • MethylPREDNISolone (MEDROL DOSEPAK) 4 MG Tablet Therapy Pack Take as directed. 21 Tab 0   • budesonide-formoterol (SYMBICORT) 160-4.5 MCG/ACT Aerosol Inhale 2 Puffs by mouth 2 Times a Day. 3 Inhaler 3   • fluticasone (FLONASE) 50 MCG/ACT nasal spray Spray 2 Sprays in nose every day. 1-2 sprays each nostril daily. 3 Bottle 3   • Umeclidinium Bromide (INCRUSE ELLIPTA) 62.5 MCG/INH AEROSOL POWDER, BREATH ACTIVATED Inhale 1 Puff by mouth every day. 1 Each 0   • OXYGEN-HELIUM INH Inhale 3 L by mouth every day.     • Diclofenac Sodium (VOLTAREN) 1 % Gel Apply  to skin as directed as needed.     • LYRICA 200 MG capsule 200 mg 3 times a day.     • metformin (GLUCOPHAGE) 500 MG Tab Take 500 mg by mouth 2 times a day, with meals.     • glipiZIDE SR (GLUCOTROL) 5 MG TABLET SR 24 HR Take 5 mg by mouth every day.     • acetaminophen/caffeine/butalbital 325-40-50 mg (FIORICET) -40 MG Tab Take 1 Tab by mouth 1 time daily as needed.     • gemfibrozil (LOPID) 600 MG Tab Take 600 mg by mouth 2 times a day.     • atenolol (TENORMIN) 25 MG Tab Take 25 mg by mouth 2 times a day.     • estradiol (ESTRACE) 2 MG Tab Take 2 mg by mouth every day.     • hydrocodone/acetaminophen (NORCO)  MG Tab Take 1-2 Tabs by mouth 2 Times a Day.     • omeprazole (PRILOSEC) 20 MG delayed-release capsule Take 20 mg by mouth every day.     • cyclobenzaprine (FLEXERIL) 5 MG tablet Take 5 mg by mouth 3 times a day as needed.     • levalbuterol (XOPENEX HFA) 45 MCG/ACT inhaler Inhale 1-2 Puffs by mouth every four hours as needed for Shortness of Breath. 3 Inhaler 1   • diazepam (VALIUM) 5 MG  "Tab Take 5 mg by mouth every 6 hours as needed for Anxiety.       No current facility-administered medications for this visit.           Allergies: Demerol          ROS   Gen: Denies fever, chills, unintentional weight loss, fatigue, night sweats  E/N/T: Denies  ear pain  Resp:Denies , wheezing,  hemoptysis  CV: Denies chest pain, chest tightness, palpitations  Sleep:Denies morning headache  Neuro: Denies frequent headaches, weakness, dizziness  GI: Denies acid reflux, N/V  See HPI.  All other systems reviewed and negative          Vital signs for this encounter:  Vitals:    01/09/19 1042 01/09/19 1110   Height: 1.676 m (5' 6\")    Weight: 84.8 kg (187 lb)    Weight % change since last entry.: 0 %    BP: 122/60    Pulse: 87    BMI (Calculated): 30.18    Resp: 18    Temp: 36.4 °C (97.5 °F)    TempSrc: Temporal    O2 sat % on O2:  94 %   O2 Flow Rate (L/min):  3                 Physical Exam:   Appearance: well developed, well nourished, no acute distress.   Eyes: PERRL, EOM intact, sclere white, conjunctiva moist.  Ears: no lesions or deformities.  Hearing: grossly intact.  Nose: no lesions or deformities.  Dentition: good dentition.   Oropharynx: tongue normal, posterior pharynx without erythema or exudate.  Neck: supple, trachea midline, no masses.  Respiratory effort: no intercostal retractions or use of accessory muscles.  Lung auscultation: Poor air movement  Heart auscultation: no murmur, rub, or gallop   Extremities: no cyanosis or edema.  Abdomen: soft, non-tender, no masses.  Gait and station: without difficulty   Digits and Nails: no clubbing, cyanosis, petechiae, or nodes.  Cranial nerves: grossly normal.  Motor: no focal deficits observed.   Skin: no rashes, lesions, or ulcers noted.  Orientation: oriented to time, place, and person.  Mood and affect: mood and affect appropriate, normal interaction with examiner.      Assessment   1. Oxygen dependent  Exercise Test for Bronchospasm / 6-Minute Walk   2. " Paralyzed vocal cords     3. Postnasal drip     4. Pulmonary emphysema, unspecified emphysema type (HCC)  Exercise Test for Bronchospasm / 6-Minute Walk   5. Pulmonary nodules  Exercise Test for Bronchospasm / 6-Minute Walk   6. BMI 30.0-30.9,adult  OBESITY COUNSELING (No Charge): Patient identified as having weight management issue.  Appropriate orders and counseling given.       Patient was seen for 25 minutes, more than 50% of time spent in face to face review, counseling, and arranging future evaluation and follow up of medical conditions and care relating to PFT results, medication management, review of Emphysema. Patient is clinically stable and will have the following changes per plan.     PLAN:   Patient Instructions   1) Continue Symbicort 160/4.5, and Incruse  2) Continue Fluticasone nasal spray.  Add sinus rinse.   3) Continue rescue inhaler as needed  4) Vaccines: Up to date with flu, Prevnar 13,  Pneumovax 23   5) CAT Scan due Jan 2019  - will check mychart for results  6) Encouraged daily exercise within limit  7) Continue 24/7 oxygen at 2-3L   8) Zpack and medrol on hand for emergency  9) Return in about 6 months (around 7/9/2019) for follow up with GOLDEN Heller, if not sooner, review of symptoms, 6 min walk.

## 2019-01-31 ENCOUNTER — HOSPITAL ENCOUNTER (OUTPATIENT)
Dept: RADIOLOGY | Facility: MEDICAL CENTER | Age: 79
End: 2019-01-31
Attending: NURSE PRACTITIONER
Payer: MEDICARE

## 2019-01-31 DIAGNOSIS — J43.9 PULMONARY EMPHYSEMA, UNSPECIFIED EMPHYSEMA TYPE (HCC): ICD-10-CM

## 2019-01-31 PROCEDURE — 71250 CT THORAX DX C-: CPT

## 2019-02-01 ENCOUNTER — TELEPHONE (OUTPATIENT)
Dept: PULMONOLOGY | Facility: HOSPICE | Age: 79
End: 2019-02-01

## 2019-02-02 NOTE — TELEPHONE ENCOUNTER
----- Message from DANIEL Rose sent at 1/31/2019  4:56 PM PST -----  Attempted to call patient. Phone not receivign LM at this time.   Nodules are stable x 3 years, deemed benign. Will get an echocardiogram on patient given results of possible pulmonary hypertension.

## 2019-05-11 ENCOUNTER — OFFICE VISIT (OUTPATIENT)
Dept: URGENT CARE | Facility: PHYSICIAN GROUP | Age: 79
End: 2019-05-11
Payer: MEDICARE

## 2019-05-11 VITALS
DIASTOLIC BLOOD PRESSURE: 58 MMHG | TEMPERATURE: 97.7 F | OXYGEN SATURATION: 96 % | BODY MASS INDEX: 27.48 KG/M2 | HEART RATE: 66 BPM | WEIGHT: 171 LBS | RESPIRATION RATE: 16 BRPM | HEIGHT: 66 IN | SYSTOLIC BLOOD PRESSURE: 120 MMHG

## 2019-05-11 DIAGNOSIS — L89.322 PRESSURE INJURY OF LEFT BUTTOCK, STAGE 2 (HCC): ICD-10-CM

## 2019-05-11 PROCEDURE — 99204 OFFICE O/P NEW MOD 45 MIN: CPT | Performed by: NURSE PRACTITIONER

## 2019-05-11 RX ORDER — LEVOTHYROXINE SODIUM 0.05 MG/1
50 TABLET ORAL
COMMUNITY
End: 2020-03-13

## 2019-05-11 RX ORDER — CEPHALEXIN 500 MG/1
500 CAPSULE ORAL 4 TIMES DAILY
Qty: 28 CAP | Refills: 0 | Status: SHIPPED | OUTPATIENT
Start: 2019-05-11 | End: 2019-05-18

## 2019-05-11 RX ORDER — DILTIAZEM HYDROCHLORIDE 60 MG/1
TABLET, FILM COATED ORAL
COMMUNITY
Start: 2019-05-10 | End: 2019-08-02

## 2019-05-11 ASSESSMENT — ENCOUNTER SYMPTOMS
CHILLS: 0
BRUISES/BLEEDS EASILY: 0
WEAKNESS: 0
MYALGIAS: 0
SENSORY CHANGE: 0
TINGLING: 0
FEVER: 0

## 2019-05-11 NOTE — PROGRESS NOTES
"Subjective:      Lelo Bryant is a 79 y.o. female who presents with Dizziness (on going for months, PT has hx of falls, PT has COPD, open wound on L buttocks, possible bed sore)            HPI  C/o sore on buttock x 1 week. States just released from rehab hospital last week. Granddaughter brought patient in, has not looked at sore. Hurts to sit. Denies fever. Wears oxygen, h/o COPD. Denies fever.     PMH:  has a past medical history of Breath shortness; CAD (coronary artery disease); Cancer ([facility]); Cataract; Dental disorder; Diabetes ([facility]); Heart burn; High cholesterol; Hypercholesterolemia; Hypertension; Oxygen dependent; Pain; Pneumonia (1985); Pulmonary embolism (HCC); and Tonsillitis.  MEDS:   Current Outpatient Prescriptions:   •  levothyroxine (LEVOXYL) 50 MCG Tab, Take 50 mcg by mouth Every morning on an empty stomach., Disp: , Rfl:   •  cephALEXin (KEFLEX) 500 MG Cap, Take 1 Cap by mouth 4 times a day for 7 days., Disp: 28 Cap, Rfl: 0  •  silver sulfADIAZINE (SILVADENE) 1 % Cream, Apply thin layer (1/16\") of cream to affected area twice daily after cleaning., Disp: 50 g, Rfl: 0  •  budesonide-formoterol (SYMBICORT) 160-4.5 MCG/ACT Aerosol, Inhale 2 Puffs by mouth 2 Times a Day., Disp: 3 Inhaler, Rfl: 3  •  fluticasone (FLONASE) 50 MCG/ACT nasal spray, Spray 2 Sprays in nose every day. 1-2 sprays each nostril daily., Disp: 3 Bottle, Rfl: 3  •  Umeclidinium Bromide (INCRUSE ELLIPTA) 62.5 MCG/INH AEROSOL POWDER, BREATH ACTIVATED, Inhale 1 Puff by mouth every day., Disp: 1 Each, Rfl: 0  •  OXYGEN-HELIUM INH, Inhale 3 L by mouth every day., Disp: , Rfl:   •  Diclofenac Sodium (VOLTAREN) 1 % Gel, Apply  to skin as directed as needed., Disp: , Rfl:   •  LYRICA 200 MG capsule, 200 mg 3 times a day., Disp: , Rfl:   •  levalbuterol (XOPENEX HFA) 45 MCG/ACT inhaler, Inhale 1-2 Puffs by mouth every four hours as needed for Shortness of Breath., Disp: 3 Inhaler, Rfl: 1  •  metformin (GLUCOPHAGE) 500 MG Tab, Take 500 " mg by mouth 2 times a day, with meals., Disp: , Rfl:   •  glipiZIDE SR (GLUCOTROL) 5 MG TABLET SR 24 HR, Take 5 mg by mouth every day., Disp: , Rfl:   •  acetaminophen/caffeine/butalbital 325-40-50 mg (FIORICET) -40 MG Tab, Take 1 Tab by mouth 1 time daily as needed., Disp: , Rfl:   •  gemfibrozil (LOPID) 600 MG Tab, Take 600 mg by mouth 2 times a day., Disp: , Rfl:   •  atenolol (TENORMIN) 25 MG Tab, Take 25 mg by mouth 2 times a day., Disp: , Rfl:   •  estradiol (ESTRACE) 2 MG Tab, Take 2 mg by mouth every day., Disp: , Rfl:   •  hydrocodone/acetaminophen (NORCO)  MG Tab, Take 1-2 Tabs by mouth 2 Times a Day., Disp: , Rfl:   •  omeprazole (PRILOSEC) 20 MG delayed-release capsule, Take 20 mg by mouth every day., Disp: , Rfl:   •  cyclobenzaprine (FLEXERIL) 5 MG tablet, Take 5 mg by mouth 3 times a day as needed., Disp: , Rfl:   •  SYMBICORT 80-4.5 MCG/ACT Aerosol, , Disp: , Rfl:   •  azithromycin (ZITHROMAX) 250 MG Tab, Take 2 tablets on day 1, then take 1 tablet a day for 4 days. (Patient not taking: Reported on 5/11/2019), Disp: 6 Tab, Rfl: 0  •  MethylPREDNISolone (MEDROL DOSEPAK) 4 MG Tablet Therapy Pack, Take as directed. (Patient not taking: Reported on 5/11/2019), Disp: 21 Tab, Rfl: 0  •  diazepam (VALIUM) 5 MG Tab, Take 5 mg by mouth every 6 hours as needed for Anxiety., Disp: , Rfl:   ALLERGIES:   Allergies   Allergen Reactions   • Demerol Nausea     SURGHX:   Past Surgical History:   Procedure Laterality Date   • LARYNGOSCOPY Bilateral 12/8/2017    Procedure: LARYNGOSCOPY - DIRECT;  Surgeon: Yosvany Gilmore M.D.;  Location: SURGERY SAME DAY [facility];  Service: Ent   • VOCAL CORD INJECTION Bilateral 12/8/2017    Procedure: VOCAL CORD INJECTION - PROLARYNX PERMANENT;  Surgeon: Yosvany Gilmore M.D.;  Location: SURGERY SAME DAY [facility];  Service: Ent   • THYROPLASTY Left 6/30/2017    Procedure: THYROPLASTY-JIMÉNEZ IMPLANT FEMALE ;  Surgeon: Yosvany Gilmore M.D.;  Location: SURGERY SAME  "DAY [facility];  Service:    • LARYNGOSCOPY N/A 6/30/2017    Procedure: LARYNGOSCOPY-FLEXIBLE NASOLARYNGOSCOPY;  Surgeon: Yosvany Gilmore M.D.;  Location: SURGERY SAME DAY [facility];  Service:    • HYSTERECTOMY LAPAROSCOPY     • LAMINOTOMY     • OTHER ABDOMINAL SURGERY      lap mayra   • OTHER ORTHOPEDIC SURGERY      R knee/scope   • PRIMARY C SECTION       SOCHX:  reports that she quit smoking about 26 years ago. Her smoking use included Cigarettes. She has a 40.00 pack-year smoking history. She has never used smokeless tobacco. She reports that she does not drink alcohol or use drugs.  FH: Family history was reviewed, no pertinent findings to report    Review of Systems   Constitutional: Negative for chills, fever and malaise/fatigue.   Musculoskeletal: Negative for myalgias.   Skin: Negative for itching and rash.   Neurological: Negative for tingling, sensory change and weakness.   Endo/Heme/Allergies: Does not bruise/bleed easily.   All other systems reviewed and are negative.         Objective:     /58 (BP Location: Right arm, Patient Position: Sitting, BP Cuff Size: Adult)   Pulse 66   Temp 36.5 °C (97.7 °F)   Resp 16   Ht 1.676 m (5' 6\")   Wt 77.6 kg (171 lb)   SpO2 96%   BMI 27.60 kg/m²      Physical Exam   Constitutional: She is oriented to person, place, and time. Vital signs are normal. She appears well-developed and well-nourished. She is active and cooperative.  Non-toxic appearance. She does not have a sickly appearance. She does not appear ill. No distress.   HENT:   Head: Normocephalic.   Eyes: Pupils are equal, round, and reactive to light. Conjunctivae and EOM are normal.   Neck: Normal range of motion. Neck supple.   Cardiovascular: Normal rate.    Pulmonary/Chest: Effort normal.   Musculoskeletal: Normal range of motion. She exhibits tenderness. She exhibits no edema.   Neurological: She is alert and oriented to person, place, and time.   Skin: Skin is warm and dry. Lesion " "noted. No abrasion, no bruising and no ecchymosis noted. She is not diaphoretic. There is erythema.        2 cm irregular border of a partial thickness pressure sore on left side of buttock, eschar material present, shallow crater in skin, TTP at site. No swelling or active bleed or d/c, mild d/c on bandage material.   Vitals reviewed.              Assessment/Plan:     1. Pressure injury of left buttock, stage 2    - cephALEXin (KEFLEX) 500 MG Cap; Take 1 Cap by mouth 4 times a day for 7 days.  Dispense: 28 Cap; Refill: 0  - silver sulfADIAZINE (SILVADENE) 1 % Cream; Apply thin layer (1/16\") of cream to affected area twice daily after cleaning.  Dispense: 50 g; Refill: 0  - REFERRAL TO WOUND CLINIC    May apply cool compress to area for any swelling and discomfort  Keep area clean with mild soap and tepid water, pat dry  May cover loosely with bandage to prevent dirt or debris into wounds  Monitor for skin infection with increased swelling, redness, pain or numbness, d/c or bleeding- f/u with wound clinic for debridement    "

## 2019-07-08 ENCOUNTER — APPOINTMENT (OUTPATIENT)
Dept: PULMONOLOGY | Facility: HOSPICE | Age: 79
End: 2019-07-08
Attending: NURSE PRACTITIONER
Payer: MEDICARE

## 2019-07-08 ENCOUNTER — APPOINTMENT (OUTPATIENT)
Dept: PULMONOLOGY | Facility: HOSPICE | Age: 79
End: 2019-07-08
Payer: MEDICARE

## 2019-08-02 ENCOUNTER — OFFICE VISIT (OUTPATIENT)
Dept: PULMONOLOGY | Facility: HOSPICE | Age: 79
End: 2019-08-02
Payer: MEDICARE

## 2019-08-02 ENCOUNTER — NON-PROVIDER VISIT (OUTPATIENT)
Dept: PULMONOLOGY | Facility: HOSPICE | Age: 79
End: 2019-08-02
Attending: NURSE PRACTITIONER
Payer: MEDICARE

## 2019-08-02 VITALS
DIASTOLIC BLOOD PRESSURE: 65 MMHG | OXYGEN SATURATION: 91 % | HEIGHT: 66 IN | HEART RATE: 67 BPM | WEIGHT: 170 LBS | SYSTOLIC BLOOD PRESSURE: 130 MMHG | BODY MASS INDEX: 27.32 KG/M2

## 2019-08-02 VITALS — WEIGHT: 171 LBS | BODY MASS INDEX: 27.6 KG/M2

## 2019-08-02 DIAGNOSIS — R91.8 PULMONARY NODULES: ICD-10-CM

## 2019-08-02 DIAGNOSIS — Z99.81 OXYGEN DEPENDENT: ICD-10-CM

## 2019-08-02 DIAGNOSIS — J38.00 PARALYZED VOCAL CORDS: ICD-10-CM

## 2019-08-02 DIAGNOSIS — R09.82 POSTNASAL DRIP: ICD-10-CM

## 2019-08-02 DIAGNOSIS — J43.9 PULMONARY EMPHYSEMA, UNSPECIFIED EMPHYSEMA TYPE (HCC): ICD-10-CM

## 2019-08-02 DIAGNOSIS — J44.9 CHRONIC OBSTRUCTIVE PULMONARY DISEASE, UNSPECIFIED COPD TYPE (HCC): ICD-10-CM

## 2019-08-02 PROCEDURE — 94618 PULMONARY STRESS TESTING: CPT | Performed by: INTERNAL MEDICINE

## 2019-08-02 PROCEDURE — 99214 OFFICE O/P EST MOD 30 MIN: CPT | Performed by: NURSE PRACTITIONER

## 2019-08-02 RX ORDER — BUDESONIDE AND FORMOTEROL FUMARATE DIHYDRATE 160; 4.5 UG/1; UG/1
2 AEROSOL RESPIRATORY (INHALATION) 2 TIMES DAILY
Qty: 3 INHALER | Refills: 3 | Status: SHIPPED | OUTPATIENT
Start: 2019-08-02

## 2019-08-02 RX ORDER — FLUTICASONE PROPIONATE 50 MCG
2 SPRAY, SUSPENSION (ML) NASAL DAILY
Qty: 3 BOTTLE | Refills: 3 | Status: SHIPPED | OUTPATIENT
Start: 2019-08-02 | End: 2019-11-01 | Stop reason: SDUPTHER

## 2019-08-02 ASSESSMENT — 6 MINUTE WALK TEST (6MWT)
HEART RATE AT 1 MIN: 77
PERCEIVED BREATHLESSNESS AT 2 MIN: 1
O2 SATURATION: CONTINUOUS
O2 SAT PERCENT ROOM AIR: 91
HEART RATE AT 5 MIN: 84
HEART RATE AT 1 MIN: 2
PERCEIVED BREATHLESSNESS AT 5 MIN: 3
SAO2 AT 1 MIN: 93
PERCEIVED FATIGUE AT 3 MIN: 2
SAO2 AT 4 MIN: 94
SAO2 AT 5 MIN: 91
BLOOD PRESSURE: LEFT ARM
PERCEIVED BREATHLESSNESS AT 3 MIN: 2
SAO2 AT 3 MIN: 88
HEART RATE AT 6 MIN: 88
SAO2 AT 2 MIN: 88
SAO2 AT 1 MIN: 89
PERCEIVED FATIGUE AT 1 MIN: 0.5
PERCEIVED FATIGUE AT 6 MIN: 3
PERCEIVED BREATHLESSNESS AT 1 MIN: 1
PERCEIVED FATIGUE AT 4 MIN: 2
PERCEIVED FATIGUE AT 2 MIN: 0.5
SAO2 AT 2 MIN: 96
PERCEIVED BREATHLESSNESS AT 2 MIN: 0.5
PERCEIVED BREATHLESSNESS AT 6 MIN: 3
SAO2 AT 6 MIN: 93
HEART RATE AT 2 MIN: 83
BLOOD PRESSURE AT 1 MIN: 135/75
HEART RATE: 67
TOTAL REST TIME: .5
HEART RATE AT 4 MIN: 82
NUMBER OF RESTS: 2
PERCEIVED BREATHLESSNESS AT 1 MIN: 2
PERCEIVED FATIGUE AT 5 MIN: 3
PERCENT OF NORMAL WALKED: 61
HEART RATE AT 3 MIN: 85
PERCEIVED FATIGUE AT 2 MIN: 1
SITTING BLOOD PRESSURE: 130/65
AMBULATES WITH O2: WITH O2
BLOOD PRESSURE AT 2 MIN: 130/70
HEART RATE AT 2 MIN: 71
PERCEIVED BREATHLESSNESS AT 4 MIN: 2

## 2019-08-02 NOTE — PROCEDURES
Pt. came into the room on 2L O2 continuous. Initial assessment at rest taken on RA. Pt. started test on RA. During the 2nd. min. phase, pt. stoped and rested due to slight dizziness. At the end of the 3rd. min., added 2L of O2 due to her saturation level. During the 4th. min. phase, pt. stoped to rest due to slight dizziness again. Pt. was able to continue and finish test on 2L. Post walk assessment taken with pt. on 2L of O2. Pt. walked a total distance of 800ft.    There is significant desaturation with ambulation to a reyes of 88% on room air.  This is corrected with 2 L of supplemental oxygen.  Total distance walked is only 800 feet which is 61% of predicted.  Patient would benefit from at least 2 L of supplemental oxygen with activity and has evidence of at least moderate decrease in exercise tolerance.

## 2019-08-02 NOTE — PROGRESS NOTES
CC:  Here for f/u pulmonary issues as listed below    HPI:   Mrs. Bryant is here for followup Emphysema, with CT and 6 min walk results.       She has a history of being exposed to tuberculosis when she was young. She has family members who were also around the uncle that has shown positive for TB.  PFTs from 1/2019 in comparison to 2017 are stable and indicate a Fev1 of 1.81L or 80% predicted without bronchodilator response, Fev1/FVC ratio of 76, DLCO 70%.    Former smoker, 40-pack-year history, quit in 1993.    Completed 6-minute walk August 2018 on room air at rest she was 91% but desaturated to a low of 88% with exertion.  She was placed on 2 L of oxygen and saturating to a low of 91%.  She has been utilizing 3 L of oxygen 24-7.       CAT scan was completed to rule out ILD completed 10- that showed a new 9 mm right upper lobe nodule, stable 3 mm left upper lobe subpleural pulmonary nodule, previously suspected 5 mm right upper lobe nodule could have represented a vessel identified on and, new appearance 3 mm superior segment right lower lobe pulmonary nodule, emphysema, probable pulmonary artery hypertension. Patient did not complete the full ILD panel, RA was negative. We completed a PET scan on 11- that showed no associated elevated activity with the 9 mm right upper lobe pulmonary nodule and 3 mm left upper lobe nodule. April 2017 cat scan showed stable nodules and emphysema changes. Updated cat scan from 1/18/2018 shows stability.    Updated CAT scan completed 1- which I reviewed again showed stability of the nodules.  Emphysema with possible pulmonary artery hypertension.  Nonspecific mediastinal lymph node unchanged.  Arthrosclerosis.  She does not require further CAT scans unless symptoms warrant.     She is compliant using Symbicort 160/4.5 2 puffs, BID with mouth rinse, Incruse. She admits she does forget a few times a week. She uses Xopenex anytime she exerts outside her home.  She  uses fluticasone nasal spray 2 times a day with benefit.   She reports stable dyspnea.  She complains of occasional cough with white/light yellow sputum, that is most likely related to postnasal drip. Mucinex with benefit. Chronic palpations unchanged.      She has had no respiratory infection since we last saw her.  She reports she was hospitalized at [facility] after she fell and hit her head.  I do not have hospital chart to review.  She occasionally has memory loss she believes due to this.  She did have surgery completed by ENT Dr. Gilmore to help with paralyzed vocal cord, along with a repeat surgery which she finds is only somewhat beneficial.  Her voice continues to crackle. Next step is speech therapy, but unable to drive that far.    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   • Postnasal drip 03/30/2017   • Pulmonary nodules 12/01/2016   • Pulmonary emphysema (HCC) 12/01/2016       Past Medical History:   Diagnosis Date   • Breath shortness    • CAD (coronary artery disease)    • Cancer (HCC)     cervical cancer   • Cataract     bilat IOL   • Dental disorder     full dentures   • Diabetes (HCC)     oral meds   • Heart burn    • High cholesterol    • Hypercholesterolemia    • Hypertension    • Oxygen dependent     3 LTR cont   • Pain     back   • Pneumonia 1985   • Pulmonary embolism (HCC)    • Tonsillitis        Past Surgical History:   Procedure Laterality Date   • LARYNGOSCOPY Bilateral 12/8/2017    Procedure: LARYNGOSCOPY - DIRECT;  Surgeon: Yosvany Gilmore M.D.;  Location: SURGERY SAME DAY [facility];  Service: Ent   • VOCAL CORD INJECTION Bilateral 12/8/2017    Procedure: VOCAL CORD INJECTION - PROLARYNX PERMANENT;  Surgeon: Yosvany Gilmore M.D.;  Location: SURGERY SAME DAY [facility];  Service: Ent   • THYROPLASTY Left 6/30/2017    Procedure: THYROPLASTY-JIMÉNEZ IMPLANT FEMALE ;  Surgeon: Yosvany Gilmore M.D.;   "Location: SURGERY SAME DAY [facility];  Service:    • LARYNGOSCOPY N/A 2017    Procedure: LARYNGOSCOPY-FLEXIBLE NASOLARYNGOSCOPY;  Surgeon: Yosvany Gilmore M.D.;  Location: SURGERY SAME DAY [facility];  Service:    • HYSTERECTOMY LAPAROSCOPY     • LAMINOTOMY     • OTHER ABDOMINAL SURGERY      lap mayra   • OTHER ORTHOPEDIC SURGERY      R knee/scope   • PRIMARY C SECTION         History reviewed. No pertinent family history.    Social History     Tobacco Use   • Smoking status: Former Smoker     Packs/day: 2.00     Years: 20.00     Pack years: 40.00     Types: Cigarettes     Last attempt to quit: 4/15/1993     Years since quittin.3   • Smokeless tobacco: Never Used   Substance Use Topics   • Alcohol use: No   • Drug use: No       Current Outpatient Medications   Medication Sig Dispense Refill   • SYMBICORT 80-4.5 MCG/ACT Aerosol      • silver sulfADIAZINE (SILVADENE) 1 % Cream Apply thin layer (\") of cream to affected area twice daily after cleaning. 50 g 0   • budesonide-formoterol (SYMBICORT) 160-4.5 MCG/ACT Aerosol Inhale 2 Puffs by mouth 2 Times a Day. 3 Inhaler 3   • fluticasone (FLONASE) 50 MCG/ACT nasal spray Spray 2 Sprays in nose every day. 1-2 sprays each nostril daily. 3 Bottle 3   • Umeclidinium Bromide (INCRUSE ELLIPTA) 62.5 MCG/INH AEROSOL POWDER, BREATH ACTIVATED Inhale 1 Puff by mouth every day. 1 Each 0   • OXYGEN-HELIUM INH Inhale 3 L by mouth every day.     • LYRICA 200 MG capsule 200 mg 3 times a day.     • levalbuterol (XOPENEX HFA) 45 MCG/ACT inhaler Inhale 1-2 Puffs by mouth every four hours as needed for Shortness of Breath. 3 Inhaler 1   • metformin (GLUCOPHAGE) 500 MG Tab Take 500 mg by mouth 2 times a day, with meals.     • glipiZIDE SR (GLUCOTROL) 5 MG TABLET SR 24 HR Take 5 mg by mouth every day.     • gemfibrozil (LOPID) 600 MG Tab Take 600 mg by mouth 2 times a day.     • estradiol (ESTRACE) 2 MG Tab Take 2 mg by mouth every day.     • diazepam (VALIUM) 5 MG Tab Take 5 " "mg by mouth every 6 hours as needed for Anxiety.     • hydrocodone/acetaminophen (NORCO)  MG Tab Take 1-2 Tabs by mouth 2 Times a Day.     • omeprazole (PRILOSEC) 20 MG delayed-release capsule Take 20 mg by mouth every day.     • cyclobenzaprine (FLEXERIL) 5 MG tablet Take 5 mg by mouth 3 times a day as needed.     • levothyroxine (LEVOXYL) 50 MCG Tab Take 50 mcg by mouth Every morning on an empty stomach.     • azithromycin (ZITHROMAX) 250 MG Tab Take 2 tablets on day 1, then take 1 tablet a day for 4 days. (Patient not taking: Reported on 5/11/2019) 6 Tab 0   • MethylPREDNISolone (MEDROL DOSEPAK) 4 MG Tablet Therapy Pack Take as directed. (Patient not taking: Reported on 5/11/2019) 21 Tab 0   • Diclofenac Sodium (VOLTAREN) 1 % Gel Apply  to skin as directed as needed.     • acetaminophen/caffeine/butalbital 325-40-50 mg (FIORICET) -40 MG Tab Take 1 Tab by mouth 1 time daily as needed.     • atenolol (TENORMIN) 25 MG Tab Take 25 mg by mouth 2 times a day.       No current facility-administered medications for this visit.           Allergies: Demerol          ROS   Gen: Denies fever, chills, unintentional weight loss, fatigue, night sweats  E/N/T: Denies nasal congestion, ear pain  Resp:Denies wheezing,  hemoptysis  CV: Denies chest pain, chest tightness,   Sleep:Denies morning headache  Neuro: Denies frequent headaches, weakness, dizziness  GI: Denies acid reflux, N/V  See HPI.  All other systems reviewed and negative          Vital signs for this encounter:  Vitals:    08/02/19 1405 08/02/19 1406   Height: 1.676 m (5' 6\")    Weight: 77.1 kg (170 lb)    Weight % change since last entry.: 0 %    BP: 130/65    Pulse: 67    BMI (Calculated): 27.44    O2 sat % on O2:  93 %   O2 Flow Rate (L/min):  2                 Physical Exam:   Appearance: well developed, well nourished, no acute distress.   Eyes: PERRL, EOM intact, sclere white, conjunctiva moist.  Ears: no lesions or deformities.  Hearing: grossly " intact.  Nose: no lesions or deformities.  Dentition: good dentition.   Oropharynx: tongue normal, posterior pharynx without erythema or exudate.  Neck: supple, trachea midline, no masses.  Respiratory effort: no intercostal retractions or use of accessory muscles.  Lung auscultation: Poor air movement  Heart auscultation: no murmur, rub, or gallop   Extremities: no cyanosis or edema.  Abdomen: soft, non-tender, no masses.  Gait and station: without difficulty   Digits and Nails: no clubbing, cyanosis, petechiae, or nodes.  Cranial nerves: grossly normal.  Motor: no focal deficits observed.   Skin: no rashes, lesions, or ulcers noted.  Orientation: oriented to time, place, and person.  Mood and affect: mood and affect appropriate, normal interaction with examiner.      Assessment   No diagnosis found.    Patient was seen for 25 minutes, more than 50% of time spent in face to face review, counseling, and arranging future evaluation and follow up of medical conditions and care relating to review of 6 min walk and oxygen needs, f/u with echocardiogram per CT results impression, medication management.  Recommend regular exercise.  Patient is clinically stable and will have the following changes per plan.     PLAN:   Patient Instructions   1) Continue Symbicort 160/4.5, and Incruse  2) Continue Fluticasone nasal spray.  Add sinus rinse.   3) Continue rescue inhaler as needed  4) Vaccines: Up to date with flu, Prevnar 13,  Pneumovax 23   5) Echocardiogram for further workup of questionable pulmonary hypertension  6) Encouraged daily exercise within limit  7) Continue oxygen at 2L with exertion   8) Zpack and medrol on hand for emergency  9) Return in about 2 months (around 10/2/2019) for follow up with [facility], if not sooner, review of symptoms, Echocardiogram.

## 2019-08-02 NOTE — PATIENT INSTRUCTIONS
1) Continue Symbicort 160/4.5, and Incruse  2) Continue Fluticasone nasal spray.  Add sinus rinse.   3) Continue rescue inhaler as needed  4) Vaccines: Up to date with flu, Prevnar 13,  Pneumovax 23   5) Echocardiogram for further workup of questionable pulmonary hypertension  6) Encouraged daily exercise within limit  7) Continue oxygen at 2L with exertion   8) Zpack and medrol on hand for emergency  9) Return in about 2 months (around 10/2/2019) for follow up with [facility], if not sooner, review of symptoms, Echocardiogram.

## 2019-09-09 ENCOUNTER — HOSPITAL ENCOUNTER (OUTPATIENT)
Dept: CARDIOLOGY | Facility: MEDICAL CENTER | Age: 79
End: 2019-09-09
Attending: NURSE PRACTITIONER
Payer: MEDICARE

## 2019-09-09 DIAGNOSIS — J43.9 PULMONARY EMPHYSEMA, UNSPECIFIED EMPHYSEMA TYPE (HCC): ICD-10-CM

## 2019-09-09 LAB
LV EJECT FRACT  99904: 60
LV EJECT FRACT MOD 2C 99903: 42.68
LV EJECT FRACT MOD 4C 99902: 61.7
LV EJECT FRACT MOD BP 99901: 63.81

## 2019-09-09 PROCEDURE — 93306 TTE W/DOPPLER COMPLETE: CPT | Mod: 26 | Performed by: INTERNAL MEDICINE

## 2019-09-09 PROCEDURE — 93306 TTE W/DOPPLER COMPLETE: CPT

## 2019-09-10 ENCOUNTER — TELEPHONE (OUTPATIENT)
Dept: PULMONOLOGY | Facility: HOSPICE | Age: 79
End: 2019-09-10

## 2019-09-10 NOTE — TELEPHONE ENCOUNTER
Pt called and left  without stating what she needed. Tried calling pt back but her number went straight to  and her mailbox is full and I was unable to leave a message.

## 2019-11-01 ENCOUNTER — OFFICE VISIT (OUTPATIENT)
Dept: PULMONOLOGY | Facility: HOSPICE | Age: 79
End: 2019-11-01
Payer: MEDICARE

## 2019-11-01 VITALS
HEIGHT: 66 IN | WEIGHT: 181 LBS | BODY MASS INDEX: 29.09 KG/M2 | OXYGEN SATURATION: 93 % | HEART RATE: 78 BPM | DIASTOLIC BLOOD PRESSURE: 56 MMHG | SYSTOLIC BLOOD PRESSURE: 124 MMHG

## 2019-11-01 DIAGNOSIS — R91.8 PULMONARY NODULES: ICD-10-CM

## 2019-11-01 DIAGNOSIS — R09.82 POSTNASAL DRIP: ICD-10-CM

## 2019-11-01 DIAGNOSIS — G47.33 OSA (OBSTRUCTIVE SLEEP APNEA): ICD-10-CM

## 2019-11-01 DIAGNOSIS — J43.9 PULMONARY EMPHYSEMA, UNSPECIFIED EMPHYSEMA TYPE (HCC): ICD-10-CM

## 2019-11-01 DIAGNOSIS — I27.20 PULMONARY HYPERTENSION (HCC): ICD-10-CM

## 2019-11-01 DIAGNOSIS — R09.02 HYPOXIA: ICD-10-CM

## 2019-11-01 DIAGNOSIS — R06.83 SNORING: ICD-10-CM

## 2019-11-01 DIAGNOSIS — J38.00 PARALYZED VOCAL CORDS: ICD-10-CM

## 2019-11-01 PROCEDURE — 99214 OFFICE O/P EST MOD 30 MIN: CPT | Performed by: NURSE PRACTITIONER

## 2019-11-01 RX ORDER — TRAZODONE HYDROCHLORIDE 100 MG/1
100 TABLET ORAL
Qty: 3 TAB | Refills: 0 | Status: SHIPPED | OUTPATIENT
Start: 2019-11-01 | End: 2020-03-13

## 2019-11-01 RX ORDER — FLUTICASONE PROPIONATE 50 MCG
2 SPRAY, SUSPENSION (ML) NASAL DAILY
Qty: 3 BOTTLE | Refills: 3 | Status: SHIPPED | OUTPATIENT
Start: 2019-11-01

## 2019-11-01 NOTE — PROGRESS NOTES
CC:  Here for f/u pulmonary issues as listed below    HPI:     Mrs. Bryant is here for followup Emphysema with echo results.       She has a history of being exposed to tuberculosis when she was young. She has family members who were also around the uncle that has shown positive for TB.  PFTs from 1/2019 in comparison to 2017 are stable and indicate a Fev1 of 1.81L or 80% predicted without bronchodilator response, Fev1/FVC ratio of 76, DLCO 70%.    Former smoker, 40-pack-year history, quit in 1993.    She has been utilizing 3 L of oxygen 24-7, although only requires 2L with exertion per last 6 min walk. She will continue to benefit from exertional oxygen.      CAT scan was completed to rule out ILD 10- that showed a new 9 mm right upper lobe nodule, stable 3 mm left upper lobe subpleural pulmonary nodule, previously suspected 5 mm right upper lobe nodule could have represented a vessel identified on and, new appearance 3 mm superior segment right lower lobe pulmonary nodule, emphysema, probable pulmonary artery hypertension. Patient did not complete the full ILD panel, RA was negative. We completed a PET scan on 11- that showed no associated elevated activity with the 9 mm right upper lobe pulmonary nodule and 3 mm left upper lobe nodule. April 2017 cat scan showed stable nodules and emphysema changes. Updated cat scan from 1/18/2018 shows stability.    Updated CAT scan completed 1- which I reviewed again showed stability of the nodules.  Emphysema with possible pulmonary artery hypertension.  Nonspecific mediastinal lymph node unchanged.  Arthrosclerosis.  She does not require further CAT scans unless symptoms warrant.  Completed echocardiogram for further evaluation of PAH noted in CAT scan completed 9-9-2019 showing estimated ejection fraction be 60%, moderate tricuspid regurgitation, estimated RVSP is 60 mmHg.     She is compliant using Symbicort 160/4.5 2 puffs, but admits to using it only  once a day with mouth rinse, Incruse. She admits she does forget a few times a week. She uses Xopenex anytime she exerts outside her home.  She uses fluticasone nasal spray 2 times a day with benefit.   She reports stable dyspnea.  She complains of occasional cough with white/light yellow sputum, that is most likely related to postnasal drip. Mucinex with benefit. Chronic palpations unchanged.       She has had no respiratory infection since we last saw her.  She reports she was hospitalized at [facility] after she fell and hit her head. She occasionally has memory loss she believes due to this.  She did have surgery completed by ENT Dr. Gilmore to help with paralyzed vocal cord, along with a repeat surgery which she finds is only somewhat beneficial.  Her voice continues to crackle. Next step is speech therapy, but unable to drive that far.    For further sleep evaluation given elevated RVSP: Patient is currently sleeping 8-9 hours per night with 2-3 nighttime awakenings. They have no trouble falling asleep. Occasionally takes valium to help facilitate sleep.   They do not feel refreshed in the morning  morning H/A. They feel tired throughout the day and naps 1x/week.  Patient reports snoring, apnea events and paroxysmal nocturnal dyspnea events. They have never fallen asleep in conversation, at the wheel, or at work.  They deny sleepwalking. Deny symptoms of restless leg.      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   • Postnasal drip 03/30/2017   • Pulmonary nodules 12/01/2016   • Chronic obstructive pulmonary disease (HCC) 12/01/2016       Past Medical History:   Diagnosis Date   • Breath shortness    • CAD (coronary artery disease)    • Cancer (HCC)     cervical cancer   • Cataract     bilat IOL   • Dental disorder     full dentures   • Diabetes (HCC)     oral meds   • Heart burn    • High cholesterol    • Hypercholesterolemia     • Hypertension    • Oxygen dependent     3 LTR cont   • Pain     back   • Pneumonia    • Pulmonary embolism (HCC)    • Tonsillitis        Past Surgical History:   Procedure Laterality Date   • LARYNGOSCOPY Bilateral 2017    Procedure: LARYNGOSCOPY - DIRECT;  Surgeon: Yosvany Gilmore M.D.;  Location: SURGERY SAME DAY [facility] ORS;  Service: Ent   • VOCAL CORD INJECTION Bilateral 2017    Procedure: VOCAL CORD INJECTION - PROLARYNX PERMANENT;  Surgeon: Yosvany Gilmore M.D.;  Location: SURGERY SAME DAY [facility] ORS;  Service: Ent   • THYROPLASTY Left 2017    Procedure: THYROPLASTY-JIMÉNEZ IMPLANT FEMALE ;  Surgeon: Yosvany Gilmore M.D.;  Location: SURGERY SAME DAY [facility] ORS;  Service:    • LARYNGOSCOPY N/A 2017    Procedure: LARYNGOSCOPY-FLEXIBLE NASOLARYNGOSCOPY;  Surgeon: Yosvany Gilmore M.D.;  Location: SURGERY SAME DAY [facility] ORS;  Service:    • HYSTERECTOMY LAPAROSCOPY     • LAMINOTOMY     • OTHER ABDOMINAL SURGERY      lap mayra   • OTHER ORTHOPEDIC SURGERY      R knee/scope   • PRIMARY C SECTION         History reviewed. No pertinent family history.    Social History     Tobacco Use   • Smoking status: Former Smoker     Packs/day: 2.00     Years: 20.00     Pack years: 40.00     Types: Cigarettes     Last attempt to quit: 4/15/1993     Years since quittin.5   • Smokeless tobacco: Never Used   Substance Use Topics   • Alcohol use: No   • Drug use: No       Current Outpatient Medications   Medication Sig Dispense Refill   • Fluticasone-Umeclidin-Vilant (TRELEGY ELLIPTA) 100-62.5-25 MCG/INH AEROSOL POWDER, BREATH ACTIVATED Inhale 1 Puff by mouth Once for 1 dose. With mouth rinse 3 Each 3   • traZODone (DESYREL) 100 MG Tab Take 1 Tab by mouth at bedtime as needed.  May repeat 1 time. for Sleep (for sleep study). 3 Tab 0   • fluticasone (FLONASE) 50 MCG/ACT nasal spray Spray 2 Sprays in nose every day. 1-2 sprays each nostril daily. 3 Bottle 3   • Umeclidinium Bromide (INCRUSE  "ELLIPTA) 62.5 MCG/INH AEROSOL POWDER, BREATH ACTIVATED Inhale 1 Puff by mouth every day. 3 Each 3   • budesonide-formoterol (SYMBICORT) 160-4.5 MCG/ACT Aerosol Inhale 2 Puffs by mouth 2 Times a Day. 3 Inhaler 3   • OXYGEN-HELIUM INH Inhale 3 L by mouth every day.     • LYRICA 200 MG capsule 200 mg 3 times a day.     • levalbuterol (XOPENEX HFA) 45 MCG/ACT inhaler Inhale 1-2 Puffs by mouth every four hours as needed for Shortness of Breath. 3 Inhaler 1   • metformin (GLUCOPHAGE) 500 MG Tab Take 500 mg by mouth 2 times a day, with meals.     • glipiZIDE SR (GLUCOTROL) 5 MG TABLET SR 24 HR Take 5 mg by mouth every day.     • gemfibrozil (LOPID) 600 MG Tab Take 600 mg by mouth 2 times a day.     • atenolol (TENORMIN) 25 MG Tab Take 25 mg by mouth 2 times a day.     • estradiol (ESTRACE) 2 MG Tab Take 2 mg by mouth every day.     • diazepam (VALIUM) 5 MG Tab Take 5 mg by mouth every 6 hours as needed for Anxiety.     • hydrocodone/acetaminophen (NORCO)  MG Tab Take 1-2 Tabs by mouth 2 Times a Day.     • omeprazole (PRILOSEC) 20 MG delayed-release capsule Take 20 mg by mouth every day.     • cyclobenzaprine (FLEXERIL) 5 MG tablet Take 5 mg by mouth 3 times a day as needed.     • levothyroxine (LEVOXYL) 50 MCG Tab Take 50 mcg by mouth Every morning on an empty stomach.     • silver sulfADIAZINE (SILVADENE) 1 % Cream Apply thin layer (1/16\") of cream to affected area twice daily after cleaning. 50 g 0   • azithromycin (ZITHROMAX) 250 MG Tab Take 2 tablets on day 1, then take 1 tablet a day for 4 days. (Patient not taking: Reported on 5/11/2019) 6 Tab 0   • MethylPREDNISolone (MEDROL DOSEPAK) 4 MG Tablet Therapy Pack Take as directed. (Patient not taking: Reported on 5/11/2019) 21 Tab 0   • Diclofenac Sodium (VOLTAREN) 1 % Gel Apply  to skin as directed as needed.     • acetaminophen/caffeine/butalbital 325-40-50 mg (FIORICET) -40 MG Tab Take 1 Tab by mouth 1 time daily as needed.       No current " "facility-administered medications for this visit.           Allergies: Demerol          ROS   Gen: Denies fever, chills, unintentional weight loss, fatigue, night sweats  E/N/T: Denies nasal congestion, ear pain  Resp:Denies sputum production, hemoptysis  CV: Denies chest pain, chest tightness, palpitations  Sleep:Denies morning headache  Neuro: Denies frequent headaches, weakness, dizziness  GI: Denies acid reflux, N/V  See HPI.  All other systems reviewed and negative          Vital signs for this encounter:  Vitals:    11/01/19 1407   Height: 1.676 m (5' 6\")   Weight: 82.1 kg (181 lb)   Weight % change since last entry.: 0 %   BP: 124/56   Pulse: 78   BMI (Calculated): 29.21                 Physical Exam:   Appearance: well developed, well nourished, no acute distress.   Eyes: PERRL, EOM intact, sclere white, conjunctiva moist.  Ears: no lesions or deformities.  Hearing: grossly intact.  Nose: no lesions or deformities.  Dentition: good dentition.   Oropharynx: tongue normal, posterior pharynx without erythema or exudate.  Neck: supple, trachea midline, no masses.  Respiratory effort: no intercostal retractions or use of accessory muscles.  Lung auscultation: Bilateral diminished   Heart auscultation: no murmur, rub, or gallop   Extremities: no cyanosis or edema.  Abdomen: soft, non-tender, no masses.  Gait and station: without difficulty   Digits and Nails: no clubbing, cyanosis, petechiae, or nodes.  Cranial nerves: grossly normal.  Motor: no focal deficits observed.   Skin: no rashes, lesions, or ulcers noted.  Orientation: oriented to time, place, and person.  Mood and affect: mood and affect appropriate, normal interaction with examiner.      Assessment   1. Pulmonary emphysema, unspecified emphysema type (HCC)  REFERRAL TO OTHER    Polysomnography, 4 or More   2. Pulmonary hypertension (HCC)  REFERRAL TO OTHER   3. Snoring  Polysomnography, 4 or More   4. Hypoxia  Polysomnography, 4 or More   5. NINOSKA " (obstructive sleep apnea)  Polysomnography, 4 or More   6. Postnasal drip  fluticasone (FLONASE) 50 MCG/ACT nasal spray   7. Pulmonary nodules     8. Paralyzed vocal cords         Patient was seen for 25 minutes, more than 50% of time spent in face to face review, counseling, and arranging future evaluation and follow up of medical conditions and care relating to review of echocardiogram and need for referral, sleep evaluation and encouragement of sleep study to rule out suspicion of sleep apnea.  Reviewed medication management, exercise importance.  Reinforced 2 L of oxygen with exertion not at rest.  Patient is clinically stable and will have the following changes per plan.     PLAN:   Patient Instructions   1) Continue Trelegy  1 puff, once a day with mouth rinse. STOP Symbicort and Incruse  2) Continue Fluticasone nasal spray.  Add sinus rinse.   3) Continue rescue inhaler as needed  4) Vaccines: Up to date with flu, Prevnar 13,  Pneumovax 23   5) Referral to Dr. Medeiros for pulmonary hypertension  6) Encouraged daily exercise within limit  7) Continue oxygen at 2L with exertion   8) Zpack and medrol on hand for emergency  9) Sleep study for suspicion of NINOSKA  10) Return in about 2 months (around 1/1/2020) for follow up with GOLDEN Heller, if not sooner, sleep study results.

## 2019-11-01 NOTE — PATIENT INSTRUCTIONS
1) Continue Trelegy  1 puff, once a day with mouth rinse. STOP Symbicort and Incruse  2) Continue Fluticasone nasal spray.  Add sinus rinse.   3) Continue rescue inhaler as needed  4) Vaccines: Up to date with flu, Prevnar 13,  Pneumovax 23   5) Referral to Dr. Medeiros for pulmonary hypertension  6) Encouraged daily exercise within limit  7) Continue oxygen at 2L with exertion   8) Zpack and medrol on hand for emergency  9) Sleep study for suspicion of NINOSKA  10) Return in about 2 months (around 1/1/2020) for follow up with GOLDEN Heller, if not sooner, sleep study results.

## 2019-12-16 ENCOUNTER — APPOINTMENT (OUTPATIENT)
Dept: SLEEP MEDICINE | Facility: MEDICAL CENTER | Age: 79
End: 2019-12-16
Attending: NURSE PRACTITIONER
Payer: MEDICARE

## 2020-01-08 ENCOUNTER — SLEEP STUDY (OUTPATIENT)
Dept: SLEEP MEDICINE | Facility: MEDICAL CENTER | Age: 80
End: 2020-01-08
Attending: NURSE PRACTITIONER
Payer: MEDICARE

## 2020-01-08 DIAGNOSIS — R06.83 SNORING: ICD-10-CM

## 2020-01-08 DIAGNOSIS — G47.33 OSA (OBSTRUCTIVE SLEEP APNEA): ICD-10-CM

## 2020-01-08 DIAGNOSIS — R09.02 HYPOXIA: ICD-10-CM

## 2020-01-08 DIAGNOSIS — J43.9 PULMONARY EMPHYSEMA, UNSPECIFIED EMPHYSEMA TYPE (HCC): ICD-10-CM

## 2020-01-08 PROCEDURE — 95810 POLYSOM 6/> YRS 4/> PARAM: CPT | Performed by: FAMILY MEDICINE

## 2020-01-09 NOTE — PROCEDURES
Comments:  The patient underwent a diagnostic polysomnogram using the standard montage for measurement of parameters of sleep, respiratory events, movement abnormalities, and heart rate and rhythm.    A microphone was used to monitor snoring.    Interpretation:  Study start time was 10:30:25 PM.  Diagnostic recording time was 7h 24.0m with a total sleep time of 5h 55.0m resulting in a sleep efficiency of 79.95%%.  Sleep latency from the start of the study was 76 minutes and the latency from sleep to REM was 53 minutes.In total,64  arousals were scored for an arousal index of 10.8. sleep stages showed decreased SE, normal WASO of 7.5 min, no N3 and normal REM sleep.    Respiratory:  There were a total of 0 apneas consisting of 0 obstructive apneas, 0 mixed apneas, and 0 central apneas.  A total of 1 hypopneas were scored.The apnea index was 0.00 per hour and the hypopnea index was 0.17 per hour resulting in an overall AHI of 0.17.AHI during rem was 0.8 and AHI while supine was 0.00.    Oximetry:  There was a mean oxygen saturation of 93.0% with a minimum oxygen saturation of 79.0%.  Time spent with oxygen saturations below 89% was 93.4 minutes.    Cardiac:  The highest heart rate seen while awake was 125 BPM while the highest heart rate during sleep was 145 BPM with an average sleeping heart rate of 68 BPM.    Limb Movements:  There were a total of 74 PLMs during sleep, of which 4 were PLMS arousals.  This resulted in a PLMS index of 12.5 and a PLMS arousal index of 0.7.    Impression:  1.  Normal AHI of 0.2/hr and O2 reyes 81 %.   2. Sleep related hypoxia  3.  Occasional PAC      Recommendations:  Clinical correlation is required. This study does not suggest clinically significant obstructive sleep apnea  with a normal AHI of 0.2/hr however she has significant sleep related hypoxia. Consider supplemental O2. In general patients with daytime sleepiness are advised to avoid sedatives and do not operate a motor vehicle  while drowsy.

## 2020-01-17 ENCOUNTER — TELEPHONE (OUTPATIENT)
Dept: SLEEP MEDICINE | Facility: MEDICAL CENTER | Age: 80
End: 2020-01-17

## 2020-01-17 DIAGNOSIS — J43.9 PULMONARY EMPHYSEMA, UNSPECIFIED EMPHYSEMA TYPE (HCC): ICD-10-CM

## 2020-01-18 NOTE — TELEPHONE ENCOUNTER
[facility].     Patient needs updated PFT and CXR before completing clearance. ORdered

## 2020-02-21 ENCOUNTER — NON-PROVIDER VISIT (OUTPATIENT)
Dept: PULMONOLOGY | Facility: HOSPICE | Age: 80
End: 2020-02-21
Attending: NURSE PRACTITIONER
Payer: MEDICARE

## 2020-02-21 ENCOUNTER — OFFICE VISIT (OUTPATIENT)
Dept: PULMONOLOGY | Facility: HOSPICE | Age: 80
End: 2020-02-21
Payer: MEDICARE

## 2020-02-21 VITALS
HEART RATE: 69 BPM | SYSTOLIC BLOOD PRESSURE: 128 MMHG | OXYGEN SATURATION: 95 % | DIASTOLIC BLOOD PRESSURE: 70 MMHG | BODY MASS INDEX: 30.22 KG/M2 | HEIGHT: 66 IN | WEIGHT: 188 LBS

## 2020-02-21 VITALS — WEIGHT: 181 LBS | BODY MASS INDEX: 29.21 KG/M2

## 2020-02-21 DIAGNOSIS — G47.34 NOCTURNAL HYPOXIA: ICD-10-CM

## 2020-02-21 DIAGNOSIS — J38.00 PARALYZED VOCAL CORDS: ICD-10-CM

## 2020-02-21 DIAGNOSIS — Z99.81 OXYGEN DEPENDENT: ICD-10-CM

## 2020-02-21 DIAGNOSIS — J43.9 PULMONARY EMPHYSEMA, UNSPECIFIED EMPHYSEMA TYPE (HCC): ICD-10-CM

## 2020-02-21 DIAGNOSIS — R09.82 POSTNASAL DRIP: ICD-10-CM

## 2020-02-21 DIAGNOSIS — R91.8 PULMONARY NODULES: ICD-10-CM

## 2020-02-21 PROCEDURE — 94729 DIFFUSING CAPACITY: CPT | Performed by: INTERNAL MEDICINE

## 2020-02-21 PROCEDURE — 94726 PLETHYSMOGRAPHY LUNG VOLUMES: CPT | Performed by: INTERNAL MEDICINE

## 2020-02-21 PROCEDURE — 99214 OFFICE O/P EST MOD 30 MIN: CPT | Mod: 25 | Performed by: NURSE PRACTITIONER

## 2020-02-21 PROCEDURE — 94060 EVALUATION OF WHEEZING: CPT | Performed by: INTERNAL MEDICINE

## 2020-02-21 RX ORDER — FLUTICASONE PROPIONATE 50 MCG
1-2 SPRAY, SUSPENSION (ML) NASAL DAILY
Qty: 1 BOTTLE | Refills: 6 | Status: SHIPPED | OUTPATIENT
Start: 2020-02-21 | End: 2022-03-22

## 2020-02-21 ASSESSMENT — PULMONARY FUNCTION TESTS
FVC_LLN: 2.33
FVC: 2.33
FVC_PREDICTED: 2.79
FEV1/FVC_PERCENT_PREDICTED: 99
FEV1_PERCENT_PREDICTED: 84
FEV1_PREDICTED: 2.12
FEV1: 1.79
FEV1/FVC_PERCENT_PREDICTED: 76
FEV1/FVC: 77
FVC_PERCENT_PREDICTED: 83
FEV1_LLN: 1.77
FEV1/FVC_PERCENT_LLN: 64
FEV1/FVC_PERCENT_PREDICTED: 101
FEV1/FVC_PREDICTED: 77
FEV1/FVC: 77

## 2020-02-21 NOTE — PROCEDURES
Technician: Tina Baxter RRT   Good patient effort & cooperation. Patient took Xopenex an hour and a half before testing, no POST FVC done.  The results of this test meet the ATS/ERS standards for acceptability and repeatability.  Predicted equations for Spirometry are GLI-2012, ITS for lung volumes, and GLI- 2017 for DLCO.  The DLCO was uncorrected for Hgb.  DLCO was performed during dilation period    Interpretation;     Lung function testing was completed on February 21, 2020.  The patient utilized maintenance and rescue inhalers the morning of the study, hence this is a postbronchodilator study.    Spirometry showed borderline low mid flows at 85% predicted.  FEV1 is borderline low at 1.8 L, 84% predicted.  Static lung volumes do not show significant restriction or hyperinflation.  Oxygen transfer is markedly reduced to 55%, consistent with the clinical diagnosis of pulmonary emphysema.  Flow volume loop confirms the obstructive pattern with good effort noted

## 2020-02-21 NOTE — LETTER
February 21, 2020        Lelo Bryant  [address] [place] [address] [address] [place] [address]        Dear Dr. Chand:    Lelo Bryant is currently under our care for a history of Emphysema and nocturnal hypoxia. She is at an increased risk for perioperative complications given the above conditions. However, at this time there is nothing that can be done to decrease her perioperative risk. She   underwent PFTs on 2/21/2020 are stable and indicated an FEV1 of 1.79 L or 84% predicted with an FEV1/FVC ratio of 77 and a DLCO of 55% predicted.  Chest x-ray completed on 1/22/20 at [facility] was unremarkable. She  is compliant with bronchodilators.  She is ordered to use oxygen with exertion and nocturnally at 2L. She is encouraged early mobilization, coughing and deep breathing, use of incentive spirometer and routine bronchodilators postop. We will be happy to follow she   postoperative care if needed.      If you have any questions or concerns, please don't hesitate to call.        Sincerely,        KODI Rose.    Electronically Signed

## 2020-02-21 NOTE — PROGRESS NOTES
CC:  Here for f/u sleep and pulmonary issues as listed below    HPI:   Mrs. Bryant is here for followup Emphysema with PFT and CXR results. Here for surgical clearance.      She has a history of being exposed to tuberculosis when she was young.  PFTs from 2/2020 indicate a Fev1 of 1.79L or 84% predicted, Fev1/FVC ratio of 77, DLCO 55%.  She did use Incruse, Symbicort, Xopenex just prior to the PFTs.  PFTs from 1/2019 in comparison to 2017 are stable and indicate a Fev1 of 1.81L or 80% predicted without bronchodilator response, Fev1/FVC ratio of 76, DLCO 70%.  She plans to have a pain stimulator placed for chronic pain.  For surgical clearance a CXR completed 1-22-20 at [facility] showed no acute cardiopulmonary process.  Former smoker, 40-pack-year history, quit in 1993.    She has been utilizing 2 L of oxygen 24-7 as needed, although requires 2L with exertion per last 6 min walk. She will continue to benefit from exertional oxygen.      CAT scan was completed to rule out ILD 10- that showed a new 9 mm right upper lobe nodule, stable 3 mm left upper lobe subpleural pulmonary nodule, previously suspected 5 mm right upper lobe nodule could have represented a vessel identified on and, new appearance 3 mm superior segment right lower lobe pulmonary nodule, emphysema, probable pulmonary artery hypertension. Patient did not complete the full ILD panel, RA was negative. We completed a PET scan on 11- that showed no associated elevated activity with the 9 mm right upper lobe pulmonary nodule and 3 mm left upper lobe nodule. April 2017 cat scan showed stable nodules and emphysema changes. Updated cat scan from 1/18/2018 shows stability.    Updated CAT scan completed 1- which I reviewed again showed stability of the nodules.  Emphysema with possible pulmonary artery hypertension.  Nonspecific mediastinal lymph node unchanged.  Arthrosclerosis.  She does not require further CAT scans unless symptoms  .  Completed echocardiogram for further evaluation of PAH noted in CAT scan completed 9-9-2019 showing estimated ejection fraction be 60%, moderate tricuspid regurgitation, estimated RVSP is 60 mmHg.  She was referred to Dr. Medeiros for pulmonary hypertension, but has not followed up.     She is compliant using Symbicort 160/4.5 2 puffs, taking it twice a day with mouth rinse, Incruse. Better at remembering. She uses Xopenex anytime she exerts outside her home.  She uses fluticasone nasal spray 2 times a day with benefit.   She reports stable dyspnea.  She complains of occasional cough with white/light yellow sputum, that is most likely related to postnasal drip. Mucinex with benefit. Chronic palpations unchanged.       She has had no respiratory infection since last OV.  She reports she was hospitalized at [facility] after she fell and hit her head. She occasionally has memory loss she believes due to this.  She did have surgery completed by ENT Dr. Gilmore to help with paralyzed vocal cord, along with a repeat surgery which she finds is only somewhat beneficial.  Her voice continues to crackle. Next step is speech therapy, but unable to drive that far.     For further sleep evaluation given elevated RVSP: PSG from 1/2020 indicated an AHI of 0.2 and low oxygenation of 81%.  She spent a total of 93.4 minutes below 89%.  Sleep efficiency was 79.9%.  The highest heart rate seen while awake was 125 and during the sleep was 145 with the average sleeping heart rate of 68 bpm.  She has been utilizing 2L at night with benefit.    Patient is currently sleeping 8-9 hours per night with 2-3 nighttime awakenings. They have no trouble falling asleep. Occasionally takes valium to help facilitate sleep.   They do not feel refreshed in the morning  morning H/A. They feel tired throughout the day and naps 1x/week.  Patient reports snoring, apnea events and paroxysmal nocturnal dyspnea events.       Patient Active  Problem List    Diagnosis Date Noted   • Nocturnal hypoxia 02/21/2020   • Oxygen dependent 08/03/2018   • Paralyzed vocal cords 06/30/2017   • Postnasal drip 03/30/2017   • Pulmonary nodules 12/01/2016   • Chronic obstructive pulmonary disease (HCC) 12/01/2016       Past Medical History:   Diagnosis Date   • Breath shortness    • CAD (coronary artery disease)    • Cancer (HCC)     cervical cancer   • Cataract     bilat IOL   • Dental disorder     full dentures   • Diabetes (HCC)     oral meds   • Heart burn    • High cholesterol    • Hypercholesterolemia    • Hypertension    • Oxygen dependent     3 LTR cont   • Pain     back   • Pneumonia 1985   • Pulmonary embolism (HCC)    • Tonsillitis        Past Surgical History:   Procedure Laterality Date   • LARYNGOSCOPY Bilateral 12/8/2017    Procedure: LARYNGOSCOPY - DIRECT;  Surgeon: Yosvany Gilmore M.D.;  Location: SURGERY SAME DAY [facility];  Service: Ent   • VOCAL CORD INJECTION Bilateral 12/8/2017    Procedure: VOCAL CORD INJECTION - PROLARYNX PERMANENT;  Surgeon: Yosvany Gilmore M.D.;  Location: SURGERY SAME DAY [facility];  Service: Ent   • THYROPLASTY Left 6/30/2017    Procedure: THYROPLASTY-JIMÉNEZ IMPLANT FEMALE ;  Surgeon: Yosvany Gilmore M.D.;  Location: SURGERY SAME DAY [facility];  Service:    • LARYNGOSCOPY N/A 6/30/2017    Procedure: LARYNGOSCOPY-FLEXIBLE NASOLARYNGOSCOPY;  Surgeon: Yosvany Gilmore M.D.;  Location: SURGERY SAME DAY [facility];  Service:    • HYSTERECTOMY LAPAROSCOPY     • LAMINOTOMY     • OTHER ABDOMINAL SURGERY      lap mayra   • OTHER ORTHOPEDIC SURGERY      R knee/scope   • PRIMARY C SECTION         History reviewed. No pertinent family history.    Social History     Socioeconomic History   • Marital status:      Spouse name: Not on file   • Number of children: Not on file   • Years of education: Not on file   • Highest education level: Not on file   Occupational History   • Not on file   Social Needs   • Financial  resource strain: Not on file   • Food insecurity     Worry: Not on file     Inability: Not on file   • Transportation needs     Medical: Not on file     Non-medical: Not on file   Tobacco Use   • Smoking status: Former Smoker     Packs/day: 2.00     Years: 20.00     Pack years: 40.00     Types: Cigarettes     Last attempt to quit: 4/15/1993     Years since quittin.8   • Smokeless tobacco: Never Used   Substance and Sexual Activity   • Alcohol use: No   • Drug use: No   • Sexual activity: Not on file   Lifestyle   • Physical activity     Days per week: Not on file     Minutes per session: Not on file   • Stress: Not on file   Relationships   • Social connections     Talks on phone: Not on file     Gets together: Not on file     Attends [employer] service: Not on file     Active member of club or organization: Not on file     Attends meetings of clubs or organizations: Not on file     Relationship status: Not on file   • Intimate partner violence     Fear of current or ex partner: Not on file     Emotionally abused: Not on file     Physically abused: Not on file     Forced sexual activity: Not on file   Other Topics Concern   • Not on file   Social History Narrative   • Not on file       Current Outpatient Medications   Medication Sig Dispense Refill   • fluticasone (FLONASE) 50 MCG/ACT nasal spray Spray 2 Sprays in nose every day. 1-2 sprays each nostril daily. 3 Bottle 3   • Umeclidinium Bromide (INCRUSE ELLIPTA) 62.5 MCG/INH AEROSOL POWDER, BREATH ACTIVATED Inhale 1 Puff by mouth every day. 3 Each 3   • budesonide-formoterol (SYMBICORT) 160-4.5 MCG/ACT Aerosol Inhale 2 Puffs by mouth 2 Times a Day. 3 Inhaler 3   • Diclofenac Sodium (VOLTAREN) 1 % Gel Apply  to skin as directed as needed.     • LYRICA 200 MG capsule 200 mg 3 times a day.     • levalbuterol (XOPENEX HFA) 45 MCG/ACT inhaler Inhale 1-2 Puffs by mouth every four hours as needed for Shortness of Breath. 3 Inhaler 1   • metformin (GLUCOPHAGE) 500 MG  "Tab Take 500 mg by mouth 2 times a day, with meals.     • glipiZIDE SR (GLUCOTROL) 5 MG TABLET SR 24 HR Take 5 mg by mouth every day.     • acetaminophen/caffeine/butalbital 325-40-50 mg (FIORICET) -40 MG Tab Take 1 Tab by mouth 1 time daily as needed.     • gemfibrozil (LOPID) 600 MG Tab Take 600 mg by mouth 2 times a day.     • atenolol (TENORMIN) 25 MG Tab Take 25 mg by mouth 2 times a day.     • estradiol (ESTRACE) 2 MG Tab Take 2 mg by mouth every day.     • diazepam (VALIUM) 5 MG Tab Take 5 mg by mouth every 6 hours as needed for Anxiety.     • hydrocodone/acetaminophen (NORCO)  MG Tab Take 1-2 Tabs by mouth 2 Times a Day.     • omeprazole (PRILOSEC) 20 MG delayed-release capsule Take 20 mg by mouth every day.     • cyclobenzaprine (FLEXERIL) 5 MG tablet Take 5 mg by mouth 3 times a day as needed.     • traZODone (DESYREL) 100 MG Tab Take 1 Tab by mouth at bedtime as needed.  May repeat 1 time. for Sleep (for sleep study). (Patient not taking: Reported on 2/21/2020) 3 Tab 0   • levothyroxine (LEVOXYL) 50 MCG Tab Take 50 mcg by mouth Every morning on an empty stomach.     • silver sulfADIAZINE (SILVADENE) 1 % Cream Apply thin layer (1/16\") of cream to affected area twice daily after cleaning. 50 g 0   • azithromycin (ZITHROMAX) 250 MG Tab Take 2 tablets on day 1, then take 1 tablet a day for 4 days. (Patient not taking: Reported on 5/11/2019) 6 Tab 0   • MethylPREDNISolone (MEDROL DOSEPAK) 4 MG Tablet Therapy Pack Take as directed. (Patient not taking: Reported on 5/11/2019) 21 Tab 0   • OXYGEN-HELIUM INH Inhale 3 L by mouth every day.       No current facility-administered medications for this visit.           Allergies: Demerol      ROS   Gen: Denies fever, chills, unintentional weight loss, fatigue, night sweats  E/N/T: Denies ear pain, Resp:Denies Dyspnea, wheezing,  sputum production, hemoptysis  CV: Denies chest pain, chest tightness, BLE edema  Sleep:Denies morning headache, insomnia, daytime " "somnolence, snoring, gasping for air, apnea  Neuro: Denies frequent headaches, weakness, dizziness  GI: Denies N/V, acid reflux/heartburn  See HPI.  All other systems reviewed and negative      Vital signs for this encounter:  Vitals:    02/21/20 1028 02/21/20 1031   Height: 1.676 m (5' 6\")    Weight: 85.3 kg (188 lb)    Weight % change since last entry.: 0 %    BP: 128/70    Pulse: 69    BMI (Calculated): 30.34    O2 sat % on O2:  95 %                 Physical Exam:   Appearance: well developed, well nourished, no acute distress.  Eyes: PERRL, EOM intact, sclere white, conjunctiva moist.  Ears: no lesions or deformities.  Hearing: grossly intact.  Nose: no lesions or deformities.  Dentition: good dentition.  Oropharynx: tongue normal, posterior pharynx without erythema or exudate.  Neck: supple, trachea midline, no masses.  Respiratory effort: no intercostal retractions or use of accessory muscles.  Lung auscultation: Bilateral diminished   Heart auscultation: no murmur, rub, or gallop.   Extremities: no cyanosis or edema.  Abdomen: soft, non-tender, no masses.  Gait and station: grossly normal   Digits and Nails: no clubbing, cyanosis, petechiae, or nodes.  Cranial nerves: grossly normal.  Motor: no focal deficits observed.  Skin: no rashes, lesions, or ulcers noted.  Orientation: oriented to time, place, and person.  Mood and affect: mood and affect appropriate, normal interaction with examiner.    Assessment   1. Oxygen dependent     2. Paralyzed vocal cords     3. Postnasal drip     4. Pulmonary emphysema, unspecified emphysema type (HCC)     5. Pulmonary nodules     6. Nocturnal hypoxia         Patient was seen for 25 minutes, more than 50% of time spent in face to face review, counseling, and arranging future evaluation and follow up of medical conditions and care related to review of PFT, CXR, medication management. Drop in DLCO may be related to pulmonary hypertension, worsening emphysema, vocal cord " dysfunction. Will continue to follow. Encourage light conditioning once pain stimulator placed.  Encouraged establishing with Dr. Rodney for pulmonary hypertension. She does not have NINOSKA, but does have nocturnal oxygen, which she has already been utilzing. Patient is clinically stable and will proceed with following plan.     PLAN:   Patient Instructions   1) Continue Symbicort and Incruse  2) Continue Fluticasone nasal spray.  Add sinus rinse.   3) Continue rescue inhaler as needed  4) Vaccines: Up to date with flu, Prevnar 13, Pneumovax 23   5) Referral to Dr. Medeiros for pulmonary hypertension  6) Encouraged daily exercise within limit  7) Continue oxygen at 2L with exertion and nocturnally  8) Zpack and medrol on hand for emergency  9) Surgical clearance for pain stimulator  10) Return in about 6 months (around 8/21/2020) for follow up with GOLDEN Heller, if not sooner, Compliance.

## 2020-02-21 NOTE — PATIENT INSTRUCTIONS
1) Continue Symbicort and Incruse  2) Continue Fluticasone nasal spray.  Add sinus rinse.   3) Continue rescue inhaler as needed  4) Vaccines: Up to date with flu, Prevnar 13, Pneumovax 23   5) Referral to Dr. Medeiros for pulmonary hypertension  6) Encouraged daily exercise within limit  7) Continue oxygen at 2L with exertion and nocturnally  8) Zpack and medrol on hand for emergency  9) Surgical clearance for pain stimulator  10) Return in about 6 months (around 8/21/2020) for follow up with GOLDEN Heller, if not sooner, Compliance.

## 2020-02-25 ENCOUNTER — HOSPITAL ENCOUNTER (OUTPATIENT)
Dept: RADIOLOGY | Facility: MEDICAL CENTER | Age: 80
End: 2020-02-25
Payer: MEDICARE

## 2020-03-13 RX ORDER — ATENOLOL 50 MG/1
50 TABLET ORAL DAILY
COMMUNITY
Start: 2020-01-31

## 2020-03-13 RX ORDER — GLIPIZIDE 2.5 MG/1
TABLET, EXTENDED RELEASE ORAL
COMMUNITY
Start: 2020-02-24 | End: 2020-03-13

## 2020-03-13 RX ORDER — CEPHALEXIN 500 MG/1
500 TABLET ORAL
COMMUNITY
Start: 2020-01-22 | End: 2022-03-22

## 2020-03-13 RX ORDER — CYCLOBENZAPRINE HCL 10 MG
TABLET ORAL
COMMUNITY
Start: 2020-02-03 | End: 2022-03-22

## 2020-03-13 RX ORDER — LEVOTHYROXINE SODIUM 0.07 MG/1
TABLET ORAL
COMMUNITY
Start: 2020-01-31 | End: 2022-03-22

## 2020-03-13 RX ORDER — LOSARTAN POTASSIUM 100 MG/1
100 TABLET ORAL EVERY EVENING
COMMUNITY
Start: 2020-02-13 | End: 2022-03-22

## 2020-03-13 RX ORDER — LEVALBUTEROL INHALATION SOLUTION 0.63 MG/3ML
0.63 SOLUTION RESPIRATORY (INHALATION) EVERY 4 HOURS PRN
COMMUNITY
End: 2020-03-13

## 2020-03-13 NOTE — OR NURSING
"Preadmit appointment: \" Preparing for your Procedure information\" Instructions discussed with Patient.   Patient instructed to continue prescribed medications through the day before surgery, instructed to take the following medications the day of surgery per anesthesia protocol:  Atenolol, Symbicort, Flonase, Norco PRN, Xopenex PRN, Synthroid, Lyrica, Prilosec, Ellipta.    Pt states, \"no issues with anesthesia\".  Fasting guidelines discussed with Patient with NPO at midnight and 2 hour prior to preadmit cutoff for clear liquids.  All Pt's questions and concerns answered or addressed.    Pt uses 3L O2 mostly at night, she will bring in her portable O2 tank and inhaler.    Labs   "

## 2020-03-15 ENCOUNTER — HOSPITAL ENCOUNTER (OUTPATIENT)
Dept: RADIOLOGY | Facility: MEDICAL CENTER | Age: 80
End: 2020-03-15
Payer: MEDICARE

## 2020-06-01 NOTE — TELEPHONE ENCOUNTER
Have we ever prescribed this med? Yes.  If yes, what date? 02/21/2020    Last OV: 02/21/2020- MANDA Ramsay    Next OV: 08/21/2020- MANDA Ramsay     Medications:   Requested Prescriptions     Pending Prescriptions Disp Refills   • Umeclidinium Bromide (INCRUSE ELLIPTA) 62.5 MCG/INH AEROSOL POWDER, BREATH ACTIVATED 3 Each 3     Sig: Inhale 1 Puff by mouth every day.

## 2020-08-02 NOTE — PROGRESS NOTES
CC:  Here for f/u pulmonary issues as listed below    HPI:   Mrs. Bryant is here for followup Emphysema.  CAT scan was completed to rule out ILD completed 10- that showed a new 9 mm right upper lobe nodule, stable 3 mm left upper lobe subpleural pulmonary nodule, previously suspected 5 mm right upper lobe nodule could have represented a vessel identified on and, new appearance 3 mm superior segment right lower lobe pulmonary nodule, emphysema, probable pulmonary artery hypertension. We completed a PET scan on 11- that showed no associated elevated activity with the 9 mm right upper lobe pulmonary nodule and 3 mm left upper lobe nodule. April 2017 cat scan showed stable nodules and emphysema changes. Patient was supposed to follow up today with obtain a CAT scan but did not complete it.     She has a history of being exposed to tuberculosis when she was young around the age of 7 by an uncle. She has family members who were also around the uncle that has shown positive for TB. Patient did not complete the full ILD panel, RA was negative.PFTs from 3/2017 are stable from last year and indicate a Fev1 of 1.81L or 78% predicted without bronchodilator response, Fev1/FVC ratio of 73, DLCO 76%.She has had no hospitalizations or respiratory infection since we last saw her. She did have surgery completed by ENT Dr. Gilmore to help with paralyzed vocal cord. Patient's hoarseness worsened since the procedure and plans to have another procedure in the coming weeks again.     She is compliant using Symbicort 160/4.5 2 puffs, BID with mouth rinse and Xopenex requiring it appx 1x/week, but reports she should use it more. She uses fluticasone nasal spray 2 times a day with benefit. She is compliant using 3 LPM of oxygen 24/7. She complains of slightly worsening dyspnea on exertion, contributing to lack of activity. Patient is sedentary and encouraged activity within limits.   She complains of occasional cough with yellow  sputum, palpitations that are stable according to her.  She denies wheezing, hemoptysis, fevers or chills, acid reflux, nasal congestion, morning headaches, snoring, orthopnea.       Patient Active Problem List    Diagnosis Date Noted   • Paralyzed vocal cords 06/30/2017   • Postnasal drip 03/30/2017   • Pulmonary nodules 12/01/2016   • Pulmonary emphysema (CMS-HCC) 12/01/2016       Past Medical History:   Diagnosis Date   • Bronchitis    • Cancer (CMS-HCC)     cervical cancer   • Cataract     bilat IOL   • Chickenpox    • Dental disorder     full dentures   • Diabetes (CMS-[facility])     oral meds   • Heart burn    • High cholesterol    • Hypercholesterolemia    • Hypertension    • Oxygen dependent     3 LTR cont   • Pneumonia    • Pulmonary embolism (CMS-HCC)    • Tonsillitis        Past Surgical History:   Procedure Laterality Date   • THYROPLASTY Left 6/30/2017    Procedure: THYROPLASTY-JIMÉNEZ IMPLANT FEMALE ;  Surgeon: Yosvany Gilmore M.D.;  Location: SURGERY SAME DAY [facility];  Service:    • LARYNGOSCOPY N/A 6/30/2017    Procedure: LARYNGOSCOPY-FLEXIBLE NASOLARYNGOSCOPY;  Surgeon: Yosvany Gilmore M.D.;  Location: SURGERY SAME DAY [facility];  Service:    • HYSTERECTOMY LAPAROSCOPY     • LAMINOTOMY     • OTHER ABDOMINAL SURGERY      lap mayra   • OTHER ORTHOPEDIC SURGERY      R knee/scope   • PRIMARY C SECTION         History reviewed. No pertinent family history.    Social History   Substance Use Topics   • Smoking status: Former Smoker     Packs/day: 2.00     Years: 20.00     Types: Cigarettes     Quit date: 4/15/1993   • Smokeless tobacco: Never Used   • Alcohol use No       Current Outpatient Prescriptions   Medication Sig Dispense Refill   • OXYGEN-HELIUM INH Inhale 3 L by mouth every day.     • hydrocodone-acetaminophen (NORCO) 5-325 MG Tab per tablet Take 1-2 Tabs by mouth every four hours as needed. 40 Tab 0   • ondansetron (ZOFRAN ODT) 4 MG TABLET DISPERSIBLE Take 1 Tab by mouth every 8 hours as  needed for Nausea/Vomiting. 20 Tab 0   • Diclofenac Sodium (VOLTAREN) 1 % Gel Apply  to skin as directed as needed.     • LYRICA 200 MG capsule 200 mg 3 times a day.     • budesonide-formoterol (SYMBICORT) 160-4.5 MCG/ACT Aerosol Inhale 2 Puffs by mouth 2 Times a Day. 3 Inhaler 3   • levalbuterol (XOPENEX HFA) 45 MCG/ACT inhaler Inhale 1-2 Puffs by mouth every four hours as needed for Shortness of Breath. 3 Inhaler 1   • fluticasone (FLONASE) 50 MCG/ACT nasal spray Spray 2 Sprays in nose every day. 1-2 sprays each nostril daily. 3 Bottle 3   • metformin (GLUCOPHAGE) 500 MG Tab Take 500 mg by mouth 2 times a day, with meals.     • glipiZIDE SR (GLUCOTROL) 5 MG TABLET SR 24 HR Take 5 mg by mouth every day.     • acetaminophen/caffeine/butalbital 325-40-50 mg (FIORICET) -40 MG Tab Take 1 Tab by mouth 1 time daily as needed.     • gemfibrozil (LOPID) 600 MG Tab Take 600 mg by mouth 2 times a day.     • atenolol (TENORMIN) 25 MG Tab Take 25 mg by mouth 2 times a day.     • estradiol (ESTRACE) 2 MG Tab Take 2 mg by mouth every day.     • diazepam (VALIUM) 5 MG Tab Take 5 mg by mouth every 6 hours as needed for Anxiety.     • hydrocodone/acetaminophen (NORCO)  MG Tab Take 1-2 Tabs by mouth 2 Times a Day.     • omeprazole (PRILOSEC) 20 MG delayed-release capsule Take 20 mg by mouth every day.     • cyclobenzaprine (FLEXERIL) 5 MG tablet Take 5 mg by mouth 3 times a day as needed.     • diclofenac epolamine (FLECTOR) 1.3 % Patch Apply 1 Patch to skin as directed as needed.       No current facility-administered medications for this visit.           Allergies: Demerol          ROS   Gen: Denies fever, chills, unintentional weight loss, fatigue, night sweats  E/N/T: Denies nasal congestion, ear pain  Resp:Denies wheezing, sputum production, hemoptysis  CV: Denies chest pain, chest tightness, palpitations  Sleep:Denies morning headache  Neuro: Denies frequent headaches, weakness, dizziness  GI: Denies acid reflux,  "N/V  See HPI.  All other systems reviewed and negative          Vital signs for this encounter:  Vitals:    10/06/17 1258   Height: 1.676 m (5' 6\")   Weight: 86.2 kg (190 lb)   Weight % change since last entry.: 0 %   BP: 122/72   Pulse: 78   BMI (Calculated): 30.67   Resp: 16   Temp: 36.5 °C (97.7 °F)   O2 sat % on O2: 96 %   O2 Flow Rate (L/min): 3       Multi-Ox Readings  Multi Ox #1     O2 sat % at rest     O2 sat % on exertion     O2 sat average on exertion     Multi Ox #2     O2 sat % at rest     O2 sat % on exertion     O2 sat average on exertion       Oxygen Use 3   Oxygen Frequency 24/7   Duration of need     Is the patient mobile within the home?     CPAP Use?     BIPAP Use?     Servo Titration                   Physical Exam:   Appearance: well developed, well nourished, no acute distress.   Eyes: PERRL, EOM intact, sclere white, conjunctiva moist.  Ears: no lesions or deformities.  Hearing: grossly intact.  Nose: no lesions or deformities.  Dentition: good dentition.   Oropharynx: tongue normal, posterior pharynx without erythema or exudate.  Neck: supple, trachea midline, no masses.  Respiratory effort: no intercostal retractions or use of accessory muscles.  Lung auscultation: Bilateral diminished   Heart auscultation: no murmur, rub, or gallop   Extremities: no cyanosis or edema.  Abdomen: soft, non-tender, no masses.  Gait and station: without difficulty   Digits and Nails: no clubbing, cyanosis, petechiae, or nodes.  Cranial nerves: grossly normal.  Motor: no focal deficits observed.   Skin: no rashes, lesions, or ulcers noted.  Orientation: oriented to time, place, and person.  Mood and affect: mood and affect appropriate, normal interaction with examiner.      Assessment   1. Pulmonary emphysema, unspecified emphysema type (CMS-HCC)  DME O2 NEW SET UP    AMB MULTIPLE OXIMETRY    AMB MULTIPLE OXIMETRY   2. Pulmonary nodules  DME O2 NEW SET UP    AMB MULTIPLE OXIMETRY    AMB MULTIPLE OXIMETRY "       Patient was seen for 30 minutes, more than 50% of time spent in face to face review, counseling, and arranging future evaluation and follow up of medical conditions and care.     PLAN:   Patient Instructions   1) Continue Symbicort 160/4.5 and rescue inhaler  2) Continue Fluticasone nasal spray. Consider adding allergy pill for post nasal drip  4) Vaccines: Up to date with flu, Prevnar 13,  Pneumovax 23   5) CAT Scan due Jan 2018  6) Encouraged daily exercise within limit  7) Continue 24/7 oxygen at 2-3L   8) Return in about 3 months (around 1/6/2018) for review of symptoms, if not sooner, follow up with GOLD[facility] Heller, CAT scan results.           [phone]

## 2020-08-21 ENCOUNTER — APPOINTMENT (OUTPATIENT)
Dept: PULMONOLOGY | Facility: HOSPICE | Age: 80
End: 2020-08-21
Payer: MEDICARE

## 2021-07-13 ENCOUNTER — HOSPITAL ENCOUNTER (OUTPATIENT)
Dept: RADIOLOGY | Facility: MEDICAL CENTER | Age: 81
End: 2021-07-13
Attending: NURSE PRACTITIONER
Payer: MEDICARE

## 2021-07-13 DIAGNOSIS — G89.29 CHRONIC HIP PAIN, LEFT: ICD-10-CM

## 2021-07-13 DIAGNOSIS — M25.552 CHRONIC HIP PAIN, LEFT: ICD-10-CM

## 2021-07-13 PROCEDURE — 73502 X-RAY EXAM HIP UNI 2-3 VIEWS: CPT | Mod: LT

## 2022-03-22 PROBLEM — L89.150 PRESSURE ULCER OF SACRAL REGION, UNSTAGEABLE (HCC): Chronic | Status: ACTIVE | Noted: 2022-03-22

## 2022-03-22 PROBLEM — L89.150 PRESSURE ULCER OF SACRAL REGION, UNSTAGEABLE (HCC): Status: ACTIVE | Noted: 2022-03-22

## 2022-03-22 PROBLEM — E07.9: Chronic | Status: ACTIVE | Noted: 2022-03-22

## 2022-03-22 PROBLEM — E07.9: Status: ACTIVE | Noted: 2022-03-22

## 2022-03-22 PROBLEM — G43.009 MIGRAINE WITHOUT AURA AND WITHOUT STATUS MIGRAINOSUS, NOT INTRACTABLE: Chronic | Status: ACTIVE | Noted: 2022-03-22

## 2022-03-22 PROBLEM — E11.40 TYPE 2 DIABETES MELLITUS WITH DIABETIC NEUROPATHY, WITHOUT LONG-TERM CURRENT USE OF INSULIN (HCC): Chronic | Status: ACTIVE | Noted: 2022-03-22

## 2022-03-22 PROBLEM — R09.82 POSTNASAL DRIP: Status: RESOLVED | Noted: 2017-03-30 | Resolved: 2022-03-22

## 2022-03-22 PROBLEM — E03.9 ACQUIRED HYPOTHYROIDISM: Chronic | Status: ACTIVE | Noted: 2022-03-22

## 2022-03-22 PROBLEM — Z71.89 ADVANCED CARE PLANNING/COUNSELING DISCUSSION: Chronic | Status: ACTIVE | Noted: 2022-03-22

## 2022-03-22 PROBLEM — E03.9 ACQUIRED HYPOTHYROIDISM: Status: ACTIVE | Noted: 2022-03-22

## 2022-03-22 PROBLEM — Z71.89 ADVANCED CARE PLANNING/COUNSELING DISCUSSION: Status: ACTIVE | Noted: 2022-03-22

## 2022-03-22 PROBLEM — E78.5 DYSLIPIDEMIA: Status: ACTIVE | Noted: 2022-03-22

## 2022-03-22 PROBLEM — E11.40 TYPE 2 DIABETES MELLITUS WITH DIABETIC NEUROPATHY, WITHOUT LONG-TERM CURRENT USE OF INSULIN (HCC): Status: ACTIVE | Noted: 2022-03-22

## 2022-03-22 PROBLEM — M54.40 CHRONIC BILATERAL LOW BACK PAIN WITH SCIATICA: Chronic | Status: ACTIVE | Noted: 2022-03-22

## 2022-03-22 PROBLEM — I10 ESSENTIAL HYPERTENSION: Status: ACTIVE | Noted: 2022-03-22

## 2022-03-22 PROBLEM — G43.009 MIGRAINE WITHOUT AURA AND WITHOUT STATUS MIGRAINOSUS, NOT INTRACTABLE: Status: ACTIVE | Noted: 2022-03-22
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
## 2022-03-22 PROBLEM — M54.40 CHRONIC BILATERAL LOW BACK PAIN WITH SCIATICA: Status: ACTIVE | Noted: 2022-03-22

## 2022-03-22 PROBLEM — I27.29: Status: ACTIVE | Noted: 2022-03-22

## 2022-03-22 PROBLEM — I27.29: Chronic | Status: ACTIVE | Noted: 2022-03-22

## 2022-03-22 PROBLEM — E78.5 DYSLIPIDEMIA: Chronic | Status: ACTIVE | Noted: 2022-03-22

## 2022-03-22 PROBLEM — G89.29 CHRONIC BILATERAL LOW BACK PAIN WITH SCIATICA: Chronic | Status: ACTIVE | Noted: 2022-03-22
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
## 2022-03-22 PROBLEM — K21.9 GERD WITHOUT ESOPHAGITIS: Chronic | Status: ACTIVE | Noted: 2022-03-22

## 2022-03-22 PROBLEM — I10 ESSENTIAL HYPERTENSION: Chronic | Status: ACTIVE | Noted: 2022-03-22

## 2022-03-22 PROBLEM — M46.1 BILATERAL SACROILIITIS (HCC): Chronic | Status: ACTIVE | Noted: 2022-03-22

## 2022-03-22 PROBLEM — G89.29 CHRONIC BILATERAL LOW BACK PAIN WITH SCIATICA: Status: ACTIVE | Noted: 2022-03-22

## 2022-03-22 PROBLEM — J38.00 PARALYZED VOCAL CORDS: Status: RESOLVED | Noted: 2017-06-30 | Resolved: 2022-03-22

## 2023-09-04 ENCOUNTER — HOSPITAL ENCOUNTER (OUTPATIENT)
Dept: RADIOLOGY | Facility: MEDICAL CENTER | Age: 83
End: 2023-09-04
Payer: MEDICARE

## 2023-09-05 ENCOUNTER — HOSPITAL ENCOUNTER (OUTPATIENT)
Facility: MEDICAL CENTER | Age: 83
End: 2023-09-05
Attending: STUDENT IN AN ORGANIZED HEALTH CARE EDUCATION/TRAINING PROGRAM
Payer: MEDICARE

## 2023-09-05 PROCEDURE — 87077 CULTURE AEROBIC IDENTIFY: CPT

## 2023-09-05 PROCEDURE — 87086 URINE CULTURE/COLONY COUNT: CPT

## 2023-09-05 PROCEDURE — 87186 SC STD MICRODIL/AGAR DIL: CPT

## 2023-09-08 LAB
BACTERIA UR CULT: ABNORMAL
BACTERIA UR CULT: ABNORMAL
SIGNIFICANT IND 70042: ABNORMAL
SITE SITE: ABNORMAL
SOURCE SOURCE: ABNORMAL

## 2023-09-20 ENCOUNTER — HOSPITAL ENCOUNTER (OUTPATIENT)
Facility: MEDICAL CENTER | Age: 83
End: 2023-09-20
Attending: UROLOGY | Admitting: UROLOGY
Payer: MEDICARE

## 2023-10-26 ENCOUNTER — APPOINTMENT (OUTPATIENT)
Dept: RADIOLOGY | Facility: MEDICAL CENTER | Age: 83
End: 2023-10-26
Attending: STUDENT IN AN ORGANIZED HEALTH CARE EDUCATION/TRAINING PROGRAM
Payer: MEDICARE

## 2023-11-29 ENCOUNTER — PATIENT MESSAGE (OUTPATIENT)
Dept: HEALTH INFORMATION MANAGEMENT | Facility: OTHER | Age: 83
End: 2023-11-29

## (undated) DEVICE — NEPTUNE 4 PORT MANIFOLD - (20/PK)

## (undated) DEVICE — KIT  I.V. START (100EA/CA)

## (undated) DEVICE — WATER IRRIGATION STERILE 1000ML (12EA/CA)

## (undated) DEVICE — PROTECTOR ULNA NERVE - (36PR/CA)

## (undated) DEVICE — MASK ANESTHESIA ADULT  - (100/CA)

## (undated) DEVICE — SUTURE GENERAL

## (undated) DEVICE — CANISTER SUCTION 3000ML MECHANICAL FILTER AUTO SHUTOFF MEDI-VAC NONSTERILE LF DISP  (40EA/CA)

## (undated) DEVICE — GLOVE BIOGEL SZ 7.5 SURGICAL PF LTX - (50PR/BX 4BX/CA)

## (undated) DEVICE — SUTURE 3-0 VICRYL PLUS SH - 8X 18 INCH (12/BX)

## (undated) DEVICE — PACK ENT OR - (2EA/CA)

## (undated) DEVICE — TUBE CONNECTING SUCTION - CLEAR PLASTIC STERILE 72 IN (50EA/CA)

## (undated) DEVICE — NEEDLE NON SAFETY 27GA X 1-1/4 IN HYPO (100EA/BX)

## (undated) DEVICE — LACTATED RINGERS INJ 1000 ML - (14EA/CA 60CA/PF)

## (undated) DEVICE — CANISTER SUCTION RIGID RED 1500CC (40EA/CA)

## (undated) DEVICE — CORDS BIPOLAR COAGULATION - 12FT STERILE DISP. (10EA/BX)

## (undated) DEVICE — PENCIL ELECTSURG 10FT BTN SWH - (50/CA)

## (undated) DEVICE — TUBING CLEARLINK DUO-VENT - C-FLO (48EA/CA)

## (undated) DEVICE — SODIUM CHL IRRIGATION 0.9% 1000ML (12EA/CA)

## (undated) DEVICE — TEETHGUARD ENT -2BX MIN ORDER- (6EA/BX)

## (undated) DEVICE — ANTI-FOG SOLUTION - 60BTL/CA

## (undated) DEVICE — SENSOR SPO2 NEO LNCS ADHESIVE (20/BX) SEE USER NOTES

## (undated) DEVICE — ELECTRODE 850 FOAM ADHESIVE - HYDROGEL RADIOTRNSPRNT (50/PK)

## (undated) DEVICE — SUCTION INSTRUMENT YANKAUER BULBOUS TIP W/O VENT (50EA/CA)

## (undated) DEVICE — KIT ANESTHESIA W/CIRCUIT & 3/LT BAG W/FILTER (20EA/CA)

## (undated) DEVICE — BLADE SAGITTAL ZS-032 - .

## (undated) DEVICE — SPONGE PEANUT - (5/PK 50PK/CA)

## (undated) DEVICE — SET LEADWIRE 5 LEAD BEDSIDE DISPOSABLE ECG (1SET OF 5/EA)

## (undated) DEVICE — CATHETER IV 20 GA X 1-1/4 ---SURG.& SDS ONLY--- (50EA/BX)

## (undated) DEVICE — SUTURE 5-0 MONOCRYL PLUS PC-3 - (12/BX)

## (undated) DEVICE — CIRCUIT VENTILATOR PEDIATRIC WITH FILTER  (20EA/CS)

## (undated) DEVICE — DRAPE MAYO STAND - (30/CA)

## (undated) DEVICE — SYRINGE 10 ML CONTROL LL (25EA/BX 4BX/CA)

## (undated) DEVICE — CANNULA DIVIDED ADULT CO2 - SAMPLE W/FEMALE CONNCT (25/CA)

## (undated) DEVICE — ELECTRODE DUAL RETURN W/ CORD - (50/PK)

## (undated) DEVICE — HEAD HOLDER JUNIOR/ADULT

## (undated) DEVICE — BOVIE NEEDLE TIP INSULATD NON-SAFETY 2CM (50/PK)

## (undated) DEVICE — LEAD SET 6 DISP. EKG NIHON KOHDEN (100EA/CA)

## (undated) DEVICE — TRAY SRGPRP PVP IOD WT PRP - (20/CA)

## (undated) DEVICE — PATTIES SURG X-RAYCOTTONOID - 1/2 X 3 IN (200/CA)

## (undated) DEVICE — PATTIES SURG NEURO X-RAY 1/2X1/2 (10EA/PK 20PK/CS)